# Patient Record
Sex: MALE | Race: WHITE | NOT HISPANIC OR LATINO | Employment: UNEMPLOYED | URBAN - METROPOLITAN AREA
[De-identification: names, ages, dates, MRNs, and addresses within clinical notes are randomized per-mention and may not be internally consistent; named-entity substitution may affect disease eponyms.]

---

## 2023-01-01 ENCOUNTER — HOME CARE VISIT (OUTPATIENT)
Dept: HOME HEALTH SERVICES | Facility: HOME HEALTH | Age: 88
End: 2023-01-01
Payer: MEDICARE

## 2023-01-01 ENCOUNTER — HOME CARE VISIT (OUTPATIENT)
Dept: HOME HEALTH SERVICES | Facility: HOME HEALTH | Age: 88
End: 2023-01-01

## 2023-01-01 ENCOUNTER — TELEPHONE (OUTPATIENT)
Dept: PRIMARY CARE | Facility: CLINIC | Age: 88
End: 2023-01-01
Payer: MEDICARE

## 2023-01-01 ENCOUNTER — HOSPITAL ENCOUNTER (INPATIENT)
Facility: HOSPITAL | Age: 88
LOS: 2 days | DRG: 208 | End: 2023-11-06
Attending: EMERGENCY MEDICINE | Admitting: INTERNAL MEDICINE
Payer: MEDICARE

## 2023-01-01 ENCOUNTER — HOSPITAL ENCOUNTER (OUTPATIENT)
Dept: DATA CONVERSION | Facility: HOSPITAL | Age: 88
Discharge: HOME | End: 2023-09-28
Payer: MEDICARE

## 2023-01-01 ENCOUNTER — TELEPHONE (OUTPATIENT)
Dept: PRIMARY CARE | Facility: CLINIC | Age: 88
End: 2023-01-01

## 2023-01-01 ENCOUNTER — APPOINTMENT (OUTPATIENT)
Dept: RADIOLOGY | Facility: HOSPITAL | Age: 88
DRG: 208 | End: 2023-01-01
Payer: MEDICARE

## 2023-01-01 ENCOUNTER — OFFICE VISIT (OUTPATIENT)
Dept: PRIMARY CARE | Facility: CLINIC | Age: 88
End: 2023-01-01
Payer: MEDICARE

## 2023-01-01 ENCOUNTER — APPOINTMENT (OUTPATIENT)
Dept: HOME HEALTH SERVICES | Facility: HOME HEALTH | Age: 88
End: 2023-01-01
Payer: MEDICARE

## 2023-01-01 ENCOUNTER — HOME HEALTH ADMISSION (OUTPATIENT)
Dept: HOME HEALTH SERVICES | Facility: HOME HEALTH | Age: 88
End: 2023-01-01
Payer: MEDICARE

## 2023-01-01 ENCOUNTER — NURSING HOME VISIT (OUTPATIENT)
Dept: POST ACUTE CARE | Facility: EXTERNAL LOCATION | Age: 88
End: 2023-01-01
Payer: MEDICARE

## 2023-01-01 VITALS
DIASTOLIC BLOOD PRESSURE: 70 MMHG | OXYGEN SATURATION: 95 % | TEMPERATURE: 97.8 F | HEART RATE: 88 BPM | SYSTOLIC BLOOD PRESSURE: 130 MMHG

## 2023-01-01 VITALS
RESPIRATION RATE: 16 BRPM | OXYGEN SATURATION: 95 % | DIASTOLIC BLOOD PRESSURE: 50 MMHG | TEMPERATURE: 97.3 F | WEIGHT: 107.2 LBS | HEART RATE: 85 BPM | SYSTOLIC BLOOD PRESSURE: 106 MMHG | HEIGHT: 69 IN | BODY MASS INDEX: 15.88 KG/M2

## 2023-01-01 VITALS
SYSTOLIC BLOOD PRESSURE: 100 MMHG | BODY MASS INDEX: 16.44 KG/M2 | HEART RATE: 60 BPM | RESPIRATION RATE: 20 BRPM | TEMPERATURE: 98.2 F | WEIGHT: 111 LBS | HEIGHT: 69 IN | DIASTOLIC BLOOD PRESSURE: 52 MMHG

## 2023-01-01 VITALS
DIASTOLIC BLOOD PRESSURE: 64 MMHG | SYSTOLIC BLOOD PRESSURE: 130 MMHG | TEMPERATURE: 98.5 F | HEART RATE: 68 BPM | RESPIRATION RATE: 16 BRPM

## 2023-01-01 VITALS
BODY MASS INDEX: 15.15 KG/M2 | HEIGHT: 70 IN | WEIGHT: 105.82 LBS | HEART RATE: 86 BPM | DIASTOLIC BLOOD PRESSURE: 33 MMHG | SYSTOLIC BLOOD PRESSURE: 65 MMHG | TEMPERATURE: 97.3 F | RESPIRATION RATE: 10 BRPM | OXYGEN SATURATION: 95 %

## 2023-01-01 VITALS — SYSTOLIC BLOOD PRESSURE: 100 MMHG | HEART RATE: 56 BPM | DIASTOLIC BLOOD PRESSURE: 76 MMHG | TEMPERATURE: 97.6 F

## 2023-01-01 VITALS
OXYGEN SATURATION: 90 % | RESPIRATION RATE: 18 BRPM | HEART RATE: 82 BPM | DIASTOLIC BLOOD PRESSURE: 58 MMHG | SYSTOLIC BLOOD PRESSURE: 118 MMHG

## 2023-01-01 DIAGNOSIS — E86.0 DEHYDRATION: Primary | ICD-10-CM

## 2023-01-01 DIAGNOSIS — Z00.00 ENCOUNTER FOR GENERAL ADULT MEDICAL EXAMINATION WITHOUT ABNORMAL FINDINGS: ICD-10-CM

## 2023-01-01 DIAGNOSIS — R63.4 WEIGHT LOSS: ICD-10-CM

## 2023-01-01 DIAGNOSIS — R62.7 FAILURE TO THRIVE IN ADULT: ICD-10-CM

## 2023-01-01 DIAGNOSIS — Z86.2 HISTORY OF MYELODYSPLASTIC SYNDROME: ICD-10-CM

## 2023-01-01 DIAGNOSIS — I48.11 LONGSTANDING PERSISTENT ATRIAL FIBRILLATION (MULTI): ICD-10-CM

## 2023-01-01 DIAGNOSIS — I48.91 ATRIAL FIBRILLATION, UNSPECIFIED TYPE (MULTI): ICD-10-CM

## 2023-01-01 DIAGNOSIS — J44.9 CHRONIC OBSTRUCTIVE PULMONARY DISEASE, UNSPECIFIED COPD TYPE (MULTI): ICD-10-CM
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
## 2023-01-01 DIAGNOSIS — J96.00 ACUTE RESPIRATORY FAILURE, UNSPECIFIED WHETHER WITH HYPOXIA OR HYPERCAPNIA (MULTI): Primary | ICD-10-CM

## 2023-01-01 DIAGNOSIS — N40.0 BENIGN PROSTATIC HYPERPLASIA WITHOUT LOWER URINARY TRACT SYMPTOMS: ICD-10-CM

## 2023-01-01 LAB
ALBUMIN SERPL-MCNC: 3.1 G/DL (ref 3.5–5)
ALP BLD-CCNC: 81 U/L (ref 35–125)
ALT SERPL-CCNC: 5 U/L (ref 5–40)
ANION GAP SERPL CALC-SCNC: 7 MMOL/L
ANION GAP SERPL CALCULATED.3IONS-SCNC: 6 MMOL/L (ref 0–19)
ANION GAP SERPL CALCULATED.3IONS-SCNC: 8 MMOL/L (ref 0–19)
APPEARANCE UR: CLEAR
AST SERPL-CCNC: 17 U/L (ref 5–40)
BACTERIA UR CULT: NO GROWTH
BASOPHILS # BLD AUTO: 0.02 X10*3/UL (ref 0–0.1)
BASOPHILS NFR BLD AUTO: 0.8 %
BILIRUB SERPL-MCNC: 0.5 MG/DL (ref 0.1–1.2)
BILIRUB UR STRIP.AUTO-MCNC: NEGATIVE MG/DL
BUN SERPL-MCNC: 25 MG/DL (ref 8–25)
BUN SERPL-MCNC: 28 MG/DL (ref 8–25)
BUN SERPL-MCNC: 31 MG/DL (ref 8–25)
BUN/CREAT SERPL: 27.8 RATIO (ref 8–21)
BUN/CREAT SERPL: 31.1 RATIO (ref 8–21)
CALCIUM SERPL-MCNC: 8.8 MG/DL (ref 8.5–10.4)
CALCIUM SERPL-MCNC: 9 MG/DL (ref 8.5–10.4)
CALCIUM SERPL-MCNC: 9.5 MG/DL (ref 8.5–10.4)
CHLORIDE SERPL-SCNC: 106 MMOL/L (ref 97–107)
CHLORIDE SERPL-SCNC: 98 MMOL/L (ref 97–107)
CHLORIDE SERPL-SCNC: 99 MMOL/L (ref 97–107)
CO2 SERPL-SCNC: 28 MMOL/L (ref 24–31)
CO2 SERPL-SCNC: 29 MMOL/L (ref 24–31)
CO2 SERPL-SCNC: 33 MMOL/L (ref 24–31)
COLOR UR: YELLOW
CREAT SERPL-MCNC: 0.9 MG/DL (ref 0.4–1.6)
DEPRECATED RDW RBC AUTO: 48 FL (ref 37–54)
DEPRECATED RDW RBC AUTO: 49.6 FL (ref 37–54)
EOSINOPHIL # BLD AUTO: 0.01 X10*3/UL (ref 0–0.4)
EOSINOPHIL NFR BLD AUTO: 0.4 %
ERYTHROCYTE [DISTWIDTH] IN BLOOD BY AUTOMATED COUNT: 14.1 % (ref 11.7–15)
ERYTHROCYTE [DISTWIDTH] IN BLOOD BY AUTOMATED COUNT: 14.2 % (ref 11.7–15)
ERYTHROCYTE [DISTWIDTH] IN BLOOD BY AUTOMATED COUNT: 16 % (ref 11.5–14.5)
GFR SERPL CREATININE-BSD FRML MDRD: 80 ML/MIN/1.73M*2
GFR SERPL CREATININE-BSD FRML MDRD: 81 ML/MIN/1.73 M2
GFR SERPL CREATININE-BSD FRML MDRD: 81 ML/MIN/1.73 M2
GLUCOSE SERPL-MCNC: 89 MG/DL (ref 65–99)
GLUCOSE SERPL-MCNC: 89 MG/DL (ref 65–99)
GLUCOSE SERPL-MCNC: 96 MG/DL (ref 65–99)
GLUCOSE UR STRIP.AUTO-MCNC: NORMAL MG/DL
HCT VFR BLD AUTO: 22.6 % (ref 41–50)
HCT VFR BLD AUTO: 24.4 % (ref 41–50)
HCT VFR BLD AUTO: 34.3 % (ref 41–52)
HGB BLD-MCNC: 7.2 GM/DL (ref 13.5–16.5)
HGB BLD-MCNC: 7.8 GM/DL (ref 13.5–16.5)
HGB BLD-MCNC: 9.8 G/DL (ref 13.5–17.5)
IMM GRANULOCYTES # BLD AUTO: 0 X10*3/UL (ref 0–0.5)
IMM GRANULOCYTES NFR BLD AUTO: 0 % (ref 0–0.9)
KETONES UR STRIP.AUTO-MCNC: NEGATIVE MG/DL
LACTATE BLDV-SCNC: 1 MMOL/L (ref 0.4–2)
LEUKOCYTE ESTERASE UR QL STRIP.AUTO: NEGATIVE
LYMPHOCYTES # BLD AUTO: 0.97 X10*3/UL (ref 0.8–3)
LYMPHOCYTES NFR BLD AUTO: 39.6 %
MCH RBC QN AUTO: 29.2 PG (ref 26–34)
MCH RBC QN AUTO: 30.3 PG (ref 26–34)
MCH RBC QN AUTO: 30.7 PG (ref 26–34)
MCHC RBC AUTO-ENTMCNC: 28.6 G/DL (ref 32–36)
MCHC RBC AUTO-ENTMCNC: 31.9 % (ref 31–37)
MCHC RBC AUTO-ENTMCNC: 32 % (ref 31–37)
MCV RBC AUTO: 102 FL (ref 80–100)
MCV RBC AUTO: 95 FL (ref 80–100)
MCV RBC AUTO: 96.1 FL (ref 80–100)
MONOCYTES # BLD AUTO: 0.42 X10*3/UL (ref 0.05–0.8)
MONOCYTES NFR BLD AUTO: 17.1 %
NEUTROPHILS # BLD AUTO: 1.03 X10*3/UL (ref 1.6–5.5)
NEUTROPHILS NFR BLD AUTO: 42.1 %
NITRITE UR QL STRIP.AUTO: NEGATIVE
NRBC BLD-RTO: 0 /100 WBC
NRBC BLD-RTO: 0 /100 WBC
NRBC BLD-RTO: 0 /100 WBCS (ref 0–0)
NT-PROBNP SERPL-MCNC: 1609 PG/ML (ref 0–852)
PH UR STRIP.AUTO: 5 [PH]
PLATELET # BLD AUTO: 119 K/UL (ref 150–450)
PLATELET # BLD AUTO: 130 K/UL (ref 150–450)
PLATELET # BLD AUTO: 87 X10*3/UL (ref 150–450)
PMV BLD AUTO: 11.5 CU (ref 7–12.6)
PMV BLD AUTO: 11.6 CU (ref 7–12.6)
POTASSIUM SERPL-SCNC: 4.4 MMOL/L (ref 3.4–5.1)
POTASSIUM SERPL-SCNC: 4.5 MMOL/L (ref 3.4–5.1)
POTASSIUM SERPL-SCNC: 4.7 MMOL/L (ref 3.4–5.1)
PROT SERPL-MCNC: 8.7 G/DL (ref 5.9–7.9)
PROT UR STRIP.AUTO-MCNC: ABNORMAL MG/DL
RBC # BLD AUTO: 2.38 M/UL (ref 4.5–5.5)
RBC # BLD AUTO: 2.54 M/UL (ref 4.5–5.5)
RBC # BLD AUTO: 3.36 X10*6/UL (ref 4.5–5.9)
RBC # UR STRIP.AUTO: ABNORMAL /UL
RBC #/AREA URNS AUTO: ABNORMAL /HPF
RBC MORPH BLD: NORMAL
SARS-COV-2 RNA RESP QL NAA+PROBE: NOT DETECTED
SODIUM SERPL-SCNC: 132 MMOL/L (ref 133–145)
SODIUM SERPL-SCNC: 136 MMOL/L (ref 133–145)
SODIUM SERPL-SCNC: 146 MMOL/L (ref 133–145)
SP GR UR STRIP.AUTO: 1.02
TROPONIN T SERPL-MCNC: 58 NG/L
TROPONIN T SERPL-MCNC: 67 NG/L
TROPONIN T SERPL-MCNC: 67 NG/L
UROBILINOGEN UR STRIP.AUTO-MCNC: NORMAL MG/DL
WBC # BLD AUTO: 1.7 K/UL (ref 4.5–11)
WBC # BLD AUTO: 2.2 K/UL (ref 4.5–11)
WBC # BLD AUTO: 2.5 X10*3/UL (ref 4.4–11.3)
WBC #/AREA URNS AUTO: ABNORMAL /HPF

## 2023-01-01 PROCEDURE — 85025 COMPLETE CBC W/AUTO DIFF WBC: CPT | Performed by: EMERGENCY MEDICINE

## 2023-01-01 PROCEDURE — G0157 HHC PT ASSISTANT EA 15: HCPCS

## 2023-01-01 PROCEDURE — 87635 SARS-COV-2 COVID-19 AMP PRB: CPT | Performed by: EMERGENCY MEDICINE

## 2023-01-01 PROCEDURE — 36415 COLL VENOUS BLD VENIPUNCTURE: CPT | Performed by: EMERGENCY MEDICINE

## 2023-01-01 PROCEDURE — 2500000004 HC RX 250 GENERAL PHARMACY W/ HCPCS (ALT 636 FOR OP/ED): Performed by: INTERNAL MEDICINE

## 2023-01-01 PROCEDURE — 99309 SBSQ NF CARE MODERATE MDM 30: CPT | Performed by: PHYSICIAN ASSISTANT

## 2023-01-01 PROCEDURE — 1210000001 HC SEMI-PRIVATE ROOM DAILY

## 2023-01-01 PROCEDURE — 84484 ASSAY OF TROPONIN QUANT: CPT | Performed by: EMERGENCY MEDICINE

## 2023-01-01 PROCEDURE — G0151 HHCP-SERV OF PT,EA 15 MIN: HCPCS

## 2023-01-01 PROCEDURE — 1160F RVW MEDS BY RX/DR IN RCRD: CPT | Performed by: NURSE PRACTITIONER

## 2023-01-01 PROCEDURE — 81001 URINALYSIS AUTO W/SCOPE: CPT | Performed by: EMERGENCY MEDICINE

## 2023-01-01 PROCEDURE — 94760 N-INVAS EAR/PLS OXIMETRY 1: CPT

## 2023-01-01 PROCEDURE — G0180 MD CERTIFICATION HHA PATIENT: HCPCS | Performed by: FAMILY MEDICINE

## 2023-01-01 PROCEDURE — 94002 VENT MGMT INPAT INIT DAY: CPT | Mod: CCI

## 2023-01-01 PROCEDURE — 83605 ASSAY OF LACTIC ACID: CPT | Performed by: EMERGENCY MEDICINE

## 2023-01-01 PROCEDURE — 99349 HOME/RES VST EST MOD MDM 40: CPT | Performed by: NURSE PRACTITIONER

## 2023-01-01 PROCEDURE — 83880 ASSAY OF NATRIURETIC PEPTIDE: CPT | Performed by: EMERGENCY MEDICINE

## 2023-01-01 PROCEDURE — 0BH17EZ INSERTION OF ENDOTRACHEAL AIRWAY INTO TRACHEA, VIA NATURAL OR ARTIFICIAL OPENING: ICD-10-PCS | Performed by: EMERGENCY MEDICINE

## 2023-01-01 PROCEDURE — 31500 INSERT EMERGENCY AIRWAY: CPT

## 2023-01-01 PROCEDURE — 71045 X-RAY EXAM CHEST 1 VIEW: CPT

## 2023-01-01 PROCEDURE — 92950 HEART/LUNG RESUSCITATION CPR: CPT

## 2023-01-01 PROCEDURE — 87086 URINE CULTURE/COLONY COUNT: CPT | Mod: WESLAB | Performed by: EMERGENCY MEDICINE

## 2023-01-01 PROCEDURE — 96365 THER/PROPH/DIAG IV INF INIT: CPT | Mod: 59

## 2023-01-01 PROCEDURE — 99285 EMERGENCY DEPT VISIT HI MDM: CPT | Mod: 25 | Performed by: EMERGENCY MEDICINE

## 2023-01-01 PROCEDURE — 2500000004 HC RX 250 GENERAL PHARMACY W/ HCPCS (ALT 636 FOR OP/ED): Performed by: EMERGENCY MEDICINE

## 2023-01-01 PROCEDURE — 87075 CULTR BACTERIA EXCEPT BLOOD: CPT | Mod: WESLAB | Performed by: EMERGENCY MEDICINE

## 2023-01-01 PROCEDURE — 87040 BLOOD CULTURE FOR BACTERIA: CPT | Mod: WESLAB | Performed by: EMERGENCY MEDICINE

## 2023-01-01 PROCEDURE — 0023 HH SOC

## 2023-01-01 PROCEDURE — 5A1935Z RESPIRATORY VENTILATION, LESS THAN 24 CONSECUTIVE HOURS: ICD-10-PCS | Performed by: EMERGENCY MEDICINE

## 2023-01-01 PROCEDURE — G0299 HHS/HOSPICE OF RN EA 15 MIN: HCPCS

## 2023-01-01 PROCEDURE — 1159F MED LIST DOCD IN RCRD: CPT | Performed by: NURSE PRACTITIONER

## 2023-01-01 PROCEDURE — 1125F AMNT PAIN NOTED PAIN PRSNT: CPT | Performed by: NURSE PRACTITIONER

## 2023-01-01 PROCEDURE — 80053 COMPREHEN METABOLIC PANEL: CPT | Performed by: EMERGENCY MEDICINE

## 2023-01-01 RX ORDER — TAMSULOSIN HYDROCHLORIDE 0.4 MG/1
1 CAPSULE ORAL DAILY
COMMUNITY
Start: 2022-02-11 | End: 2023-01-01 | Stop reason: WASHOUT

## 2023-01-01 RX ORDER — BISACODYL 5 MG
1 TABLET, DELAYED RELEASE (ENTERIC COATED) ORAL DAILY PRN
COMMUNITY
Start: 2023-01-01

## 2023-01-01 RX ORDER — DEXTROSE MONOHYDRATE AND SODIUM CHLORIDE 5; .45 G/100ML; G/100ML
100 INJECTION, SOLUTION INTRAVENOUS CONTINUOUS
Status: CANCELLED | OUTPATIENT
Start: 2023-01-01 | End: 2023-01-01

## 2023-01-01 RX ORDER — MIDAZOLAM HYDROCHLORIDE 1 MG/ML
.5-2 INJECTION, SOLUTION INTRAVENOUS ONCE
Status: DISCONTINUED | OUTPATIENT
Start: 2023-01-01 | End: 2023-01-01

## 2023-01-01 RX ORDER — HALOPERIDOL 5 MG/ML
1 INJECTION INTRAMUSCULAR EVERY 6 HOURS
Status: DISCONTINUED | OUTPATIENT
Start: 2023-01-01 | End: 2023-01-01

## 2023-01-01 RX ORDER — HALOPERIDOL 5 MG/ML
1 INJECTION INTRAMUSCULAR EVERY 6 HOURS PRN
Status: DISCONTINUED | OUTPATIENT
Start: 2023-01-01 | End: 2023-01-01 | Stop reason: HOSPADM

## 2023-01-01 RX ORDER — PANTOPRAZOLE SODIUM 40 MG/10ML
40 INJECTION, POWDER, LYOPHILIZED, FOR SOLUTION INTRAVENOUS DAILY
Status: CANCELLED | OUTPATIENT
Start: 2023-01-01

## 2023-01-01 RX ORDER — MIDAZOLAM HYDROCHLORIDE 5 MG/ML
5 INJECTION, SOLUTION INTRAMUSCULAR; INTRAVENOUS ONCE
Status: COMPLETED | OUTPATIENT
Start: 2023-01-01 | End: 2023-01-01

## 2023-01-01 RX ORDER — LORAZEPAM 2 MG/ML
0.5 INJECTION INTRAMUSCULAR EVERY 2 HOUR PRN
Status: DISCONTINUED | OUTPATIENT
Start: 2023-01-01 | End: 2023-01-01 | Stop reason: HOSPADM

## 2023-01-01 RX ORDER — MIDAZOLAM HYDROCHLORIDE 1 MG/ML
.5-2 INJECTION, SOLUTION INTRAVENOUS ONCE
Status: COMPLETED | OUTPATIENT
Start: 2023-01-01 | End: 2023-01-01

## 2023-01-01 RX ORDER — LORAZEPAM 2 MG/ML
0.5 INJECTION INTRAMUSCULAR EVERY 4 HOURS PRN
Status: DISCONTINUED | OUTPATIENT
Start: 2023-01-01 | End: 2023-01-01

## 2023-01-01 RX ORDER — TIOTROPIUM BROMIDE 18 UG/1
1 CAPSULE ORAL; RESPIRATORY (INHALATION)
COMMUNITY
Start: 2023-01-01 | End: 2023-01-01 | Stop reason: WASHOUT

## 2023-01-01 RX ORDER — INFANT FORMULA WITH IRON
1 POWDER (GRAM) ORAL
COMMUNITY
Start: 2023-01-01

## 2023-01-01 RX ORDER — DEXTROSE MONOHYDRATE AND SODIUM CHLORIDE 5; .9 G/100ML; G/100ML
100 INJECTION, SOLUTION INTRAVENOUS CONTINUOUS
Status: CANCELLED | OUTPATIENT
Start: 2023-01-01 | End: 2023-01-01

## 2023-01-01 RX ORDER — HYOSCYAMINE SULFATE 0.12 MG/1
0.12 TABLET, ORALLY DISINTEGRATING ORAL EVERY 4 HOURS PRN
Status: DISCONTINUED | OUTPATIENT
Start: 2023-01-01 | End: 2023-01-01 | Stop reason: HOSPADM

## 2023-01-01 RX ORDER — MORPHINE SULFATE 2 MG/ML
2 INJECTION, SOLUTION INTRAMUSCULAR; INTRAVENOUS
Status: DISCONTINUED | OUTPATIENT
Start: 2023-01-01 | End: 2023-01-01 | Stop reason: HOSPADM

## 2023-01-01 RX ORDER — LACTOSE-REDUCED FOOD
237 LIQUID (ML) ORAL 3 TIMES DAILY
COMMUNITY
Start: 2023-01-01

## 2023-01-01 RX ORDER — LIDOCAINE 560 MG/1
PATCH PERCUTANEOUS; TOPICAL; TRANSDERMAL
COMMUNITY

## 2023-01-01 RX ADMIN — CEFEPIME: 1 INJECTION, POWDER, FOR SOLUTION INTRAMUSCULAR; INTRAVENOUS at 10:49

## 2023-01-01 RX ADMIN — MORPHINE SULFATE 2 MG: 2 INJECTION, SOLUTION INTRAMUSCULAR; INTRAVENOUS at 19:35

## 2023-01-01 RX ADMIN — HALOPERIDOL LACTATE 1 MG: 5 INJECTION, SOLUTION INTRAMUSCULAR at 15:32

## 2023-01-01 RX ADMIN — MIDAZOLAM HYDROCHLORIDE 1 MG/HR: 1 INJECTION, SOLUTION INTRAVENOUS at 10:41

## 2023-01-01 RX ADMIN — MORPHINE SULFATE 2 MG: 2 INJECTION, SOLUTION INTRAMUSCULAR; INTRAVENOUS at 15:32

## 2023-01-01 RX ADMIN — SODIUM CHLORIDE 1000 ML: 900 INJECTION, SOLUTION INTRAVENOUS at 10:52

## 2023-01-01 RX ADMIN — MIDAZOLAM HYDROCHLORIDE 5 MG: 5 INJECTION, SOLUTION INTRAMUSCULAR; INTRAVENOUS at 10:00

## 2023-01-01 SDOH — HEALTH STABILITY: PHYSICAL HEALTH: EXERCISE TYPE: BLE EXS

## 2023-01-01 SDOH — SOCIAL STABILITY: SOCIAL INSECURITY
WITHIN THE LAST YEAR, HAVE TO BEEN RAPED OR FORCED TO HAVE ANY KIND OF SEXUAL ACTIVITY BY YOUR PARTNER OR EX-PARTNER?: NO

## 2023-01-01 SDOH — HEALTH STABILITY: PHYSICAL HEALTH: EXERCISE ACTIVITY: APS, HIP ABD, HEEL SLIDES

## 2023-01-01 SDOH — SOCIAL STABILITY: SOCIAL INSECURITY: ARE THERE ANY APPARENT SIGNS OF INJURIES/BEHAVIORS THAT COULD BE RELATED TO ABUSE/NEGLECT?: UNABLE TO ASSESS

## 2023-01-01 SDOH — HEALTH STABILITY: PHYSICAL HEALTH: EXERCISE COMMENTS: PT DID NOT FEEL GOOD AT ALL BUT DID RETURN CORRECT DEMOS OF THER EXS

## 2023-01-01 SDOH — HEALTH STABILITY: PHYSICAL HEALTH: EXERCISE ACTIVITIES SETS: 1

## 2023-01-01 SDOH — HEALTH STABILITY: MENTAL HEALTH: HOW OFTEN DO YOU HAVE 6 OR MORE DRINKS ON ONE OCCASION?: NEVER

## 2023-01-01 SDOH — HEALTH STABILITY: PHYSICAL HEALTH: PHYSICAL EXERCISE: 10

## 2023-01-01 SDOH — SOCIAL STABILITY: SOCIAL INSECURITY
WITHIN THE LAST YEAR, HAVE YOU BEEN KICKED, HIT, SLAPPED, OR OTHERWISE PHYSICALLY HURT BY YOUR PARTNER OR EX-PARTNER?: NO

## 2023-01-01 SDOH — HEALTH STABILITY: PHYSICAL HEALTH: PHYSICAL EXERCISE: SEATED

## 2023-01-01 SDOH — ECONOMIC STABILITY: FOOD INSECURITY: WITHIN THE PAST 12 MONTHS, THE FOOD YOU BOUGHT JUST DIDN'T LAST AND YOU DIDN'T HAVE MONEY TO GET MORE.: NEVER TRUE

## 2023-01-01 SDOH — SOCIAL STABILITY: SOCIAL INSECURITY: DO YOU FEEL ANYONE HAS EXPLOITED OR TAKEN ADVANTAGE OF YOU FINANCIALLY OR OF YOUR PERSONAL PROPERTY?: UNABLE TO ASSESS

## 2023-01-01 SDOH — SOCIAL STABILITY: SOCIAL NETWORK: HOW OFTEN DO YOU ATTENT MEETINGS OF THE CLUB OR ORGANIZATION YOU BELONG TO?: NEVER

## 2023-01-01 SDOH — HEALTH STABILITY: PHYSICAL HEALTH: EXERCISE ACTIVITY: LAQS, MARCHES, HIP ABD WITH YELLOW TBAND

## 2023-01-01 SDOH — SOCIAL STABILITY: SOCIAL INSECURITY: DO YOU FEEL UNSAFE GOING BACK TO THE PLACE WHERE YOU ARE LIVING?: UNABLE TO ASSESS

## 2023-01-01 SDOH — ECONOMIC STABILITY: INCOME INSECURITY: IN THE PAST 12 MONTHS, HAS THE ELECTRIC, GAS, OIL, OR WATER COMPANY THREATENED TO SHUT OFF SERVICE IN YOUR HOME?: NO

## 2023-01-01 SDOH — HEALTH STABILITY: PHYSICAL HEALTH: EXERCISE ACTIVITY: APS, LAQS, MARCHES, HIP ABD/ADD

## 2023-01-01 SDOH — SOCIAL STABILITY: SOCIAL INSECURITY: ABUSE: ADULT

## 2023-01-01 SDOH — HEALTH STABILITY: PHYSICAL HEALTH: ON AVERAGE, HOW MANY MINUTES DO YOU ENGAGE IN EXERCISE AT THIS LEVEL?: 0 MIN

## 2023-01-01 SDOH — HEALTH STABILITY: MENTAL HEALTH: HOW OFTEN DO YOU HAVE A DRINK CONTAINING ALCOHOL?: NEVER

## 2023-01-01 SDOH — SOCIAL STABILITY: SOCIAL INSECURITY: WITHIN THE LAST YEAR, HAVE YOU BEEN AFRAID OF YOUR PARTNER OR EX-PARTNER?: NO

## 2023-01-01 SDOH — HEALTH STABILITY: PHYSICAL HEALTH: PHYSICAL EXERCISE: 5

## 2023-01-01 SDOH — SOCIAL STABILITY: SOCIAL NETWORK: IN A TYPICAL WEEK, HOW MANY TIMES DO YOU TALK ON THE PHONE WITH FAMILY, FRIENDS, OR NEIGHBORS?: NEVER

## 2023-01-01 SDOH — HEALTH STABILITY: PHYSICAL HEALTH: EXERCISE COMMENTS: DUCTION        FOR UP TO 20 REPETITIONS, TWICE A DAY.

## 2023-01-01 SDOH — HEALTH STABILITY: MENTAL HEALTH: HOW MANY STANDARD DRINKS CONTAINING ALCOHOL DO YOU HAVE ON A TYPICAL DAY?: PATIENT DOES NOT DRINK

## 2023-01-01 SDOH — HEALTH STABILITY: PHYSICAL HEALTH: EXERCISE ACTIVITY: HIP ABD, HEEL SLIDES

## 2023-01-01 SDOH — ECONOMIC STABILITY: FOOD INSECURITY: WITHIN THE PAST 12 MONTHS, YOU WORRIED THAT YOUR FOOD WOULD RUN OUT BEFORE YOU GOT MONEY TO BUY MORE.: NEVER TRUE

## 2023-01-01 SDOH — SOCIAL STABILITY: SOCIAL NETWORK
DO YOU BELONG TO ANY CLUBS OR ORGANIZATIONS SUCH AS CHURCH GROUPS UNIONS, FRATERNAL OR ATHLETIC GROUPS, OR SCHOOL GROUPS?: NO

## 2023-01-01 SDOH — HEALTH STABILITY: PHYSICAL HEALTH
EXERCISE COMMENTS: STRENGTHENING EXERCISES SEATED AND SUPINE X 10 AP HEEL SLIDES SLR ABD , LAQ HIP KNEE FLEXIION HEELSLIDES

## 2023-01-01 SDOH — SOCIAL STABILITY: SOCIAL INSECURITY: HAVE YOU HAD THOUGHTS OF HARMING ANYONE ELSE?: UNABLE TO ASSESS

## 2023-01-01 SDOH — SOCIAL STABILITY: SOCIAL INSECURITY: ARE YOU OR HAVE YOU BEEN THREATENED OR ABUSED PHYSICALLY, EMOTIONALLY, OR SEXUALLY BY ANYONE?: UNABLE TO ASSESS

## 2023-01-01 SDOH — HEALTH STABILITY: PHYSICAL HEALTH: PHYSICAL EXERCISE: RECLINED

## 2023-01-01 SDOH — ECONOMIC STABILITY: INCOME INSECURITY: HOW HARD IS IT FOR YOU TO PAY FOR THE VERY BASICS LIKE FOOD, HOUSING, MEDICAL CARE, AND HEATING?: NOT HARD AT ALL

## 2023-01-01 SDOH — HEALTH STABILITY: PHYSICAL HEALTH: ON AVERAGE, HOW MANY DAYS PER WEEK DO YOU ENGAGE IN MODERATE TO STRENUOUS EXERCISE (LIKE A BRISK WALK)?: 0 DAYS

## 2023-01-01 SDOH — SOCIAL STABILITY: SOCIAL NETWORK: ARE YOU MARRIED, WIDOWED, DIVORCED, SEPARATED, NEVER MARRIED, OR LIVING WITH A PARTNER?: WIDOWED

## 2023-01-01 SDOH — SOCIAL STABILITY: SOCIAL INSECURITY: HAS ANYONE EVER THREATENED TO HURT YOUR FAMILY OR YOUR PETS?: UNABLE TO ASSESS

## 2023-01-01 SDOH — SOCIAL STABILITY: SOCIAL INSECURITY: WITHIN THE LAST YEAR, HAVE YOU BEEN HUMILIATED OR EMOTIONALLY ABUSED IN OTHER WAYS BY YOUR PARTNER OR EX-PARTNER?: NO

## 2023-01-01 SDOH — HEALTH STABILITY: PHYSICAL HEALTH
EXERCISE COMMENTS: PT NEEDING OCCASSIONAL REST BREAKS DUEING EXS BUT IMPROVED TOLERANCE NOTED, DAUGHTER STATES PT HAS BEEN DOING HEP

## 2023-01-01 SDOH — SOCIAL STABILITY: SOCIAL INSECURITY: WERE YOU ABLE TO COMPLETE ALL THE BEHAVIORAL HEALTH SCREENINGS?: NO

## 2023-01-01 SDOH — ECONOMIC STABILITY: HOUSING INSECURITY
IN THE LAST 12 MONTHS, WAS THERE A TIME WHEN YOU DID NOT HAVE A STEADY PLACE TO SLEEP OR SLEPT IN A SHELTER (INCLUDING NOW)?: NO

## 2023-01-01 SDOH — ECONOMIC STABILITY: TRANSPORTATION INSECURITY
IN THE PAST 12 MONTHS, HAS THE LACK OF TRANSPORTATION KEPT YOU FROM MEDICAL APPOINTMENTS OR FROM GETTING MEDICATIONS?: NO

## 2023-01-01 SDOH — SOCIAL STABILITY: SOCIAL INSECURITY: HAVE YOU HAD THOUGHTS OF HARMING ANYONE ELSE?: NO

## 2023-01-01 SDOH — SOCIAL STABILITY: SOCIAL NETWORK: HOW OFTEN DO YOU GET TOGETHER WITH FRIENDS OR RELATIVES?: NEVER

## 2023-01-01 SDOH — SOCIAL STABILITY: SOCIAL INSECURITY: DOES ANYONE TRY TO KEEP YOU FROM HAVING/CONTACTING OTHER FRIENDS OR DOING THINGS OUTSIDE YOUR HOME?: UNABLE TO ASSESS

## 2023-01-01 SDOH — SOCIAL STABILITY: SOCIAL NETWORK: HOW OFTEN DO YOU ATTEND CHURCH OR RELIGIOUS SERVICES?: NEVER

## 2023-01-01 SDOH — ECONOMIC STABILITY: INCOME INSECURITY: IN THE LAST 12 MONTHS, WAS THERE A TIME WHEN YOU WERE NOT ABLE TO PAY THE MORTGAGE OR RENT ON TIME?: NO

## 2023-01-01 SDOH — HEALTH STABILITY: PHYSICAL HEALTH
EXERCISE COMMENTS: REVIEW OF WRITTEN EXERCISES  1.  SEATED ANKLE PUMPS  2.  SEATED MARCHING OR HIP FLEXION  3.  SEATED FULL ARC QUADS OR "KICKS"  4.  SEATED HIP ABDUCTION  5.  SUPINE ANKLE PUMPS  6.  SUPINE HEEL SLIDES  7.  SUPINE STRAIGHT LEG RAISES  8.  SUPINE HIP AB

## 2023-01-01 SDOH — HEALTH STABILITY: PHYSICAL HEALTH: PHYSICAL EXERCISE: SUPINE

## 2023-01-01 SDOH — HEALTH STABILITY: PHYSICAL HEALTH: EXERCISE ACTIVITY: APS, LAQS, MARCHES

## 2023-01-01 SDOH — HEALTH STABILITY: PHYSICAL HEALTH: EXERCISE ACTIVITY: HEEL SLIDES HIP ABD

## 2023-01-01 SDOH — ECONOMIC STABILITY: TRANSPORTATION INSECURITY
IN THE PAST 12 MONTHS, HAS LACK OF TRANSPORTATION KEPT YOU FROM MEETINGS, WORK, OR FROM GETTING THINGS NEEDED FOR DAILY LIVING?: NO

## 2023-01-01 SDOH — HEALTH STABILITY: PHYSICAL HEALTH: EXERCISE ACTIVITY: SLRS

## 2023-01-01 SDOH — HEALTH STABILITY: PHYSICAL HEALTH: EXERCISE TYPE: BASIC HOME PROGRAM

## 2023-01-01 SDOH — ECONOMIC STABILITY: HOUSING INSECURITY: IN THE LAST 12 MONTHS, HOW MANY PLACES HAVE YOU LIVED?: 1

## 2023-01-01 ASSESSMENT — ENCOUNTER SYMPTOMS
DIARRHEA: 0
OCCASIONAL FEELINGS OF UNSTEADINESS: 1
PAIN: 1
ABDOMINAL PAIN: 0
SUBJECTIVE PAIN PROGRESSION: GRADUALLY IMPROVING
PAIN LOCATION: RIGHT KNEE
LAST BOWEL MOVEMENT: 66761
NAUSEA: 0
LOWEST PAIN SEVERITY IN PAST 24 HOURS: 5/10
PAIN LOCATION - PAIN QUALITY: STOMACH ACHE
PAIN LOCATION: ABDOMEN
OCCASIONAL FEELINGS OF UNSTEADINESS: 1
COUGH: 0
HEADACHES: 0
HIGHEST PAIN SEVERITY IN PAST 24 HOURS: 4/10
BOWEL PATTERN NORMAL: 1
OCCASIONAL FEELINGS OF UNSTEADINESS: 1
HIGHEST PAIN SEVERITY IN PAST 24 HOURS: 4/10
PAIN LOCATION: RIGHT KNEE
PAIN SEVERITY GOAL: 0/10
DENIES PAIN: 1
FREQUENCY: 0
PAIN LOCATION - PAIN SEVERITY: 3/10
PAIN LOCATION - PAIN SEVERITY: 0/10
LOSS OF SENSATION IN FEET: 0
PAIN LOCATION - PAIN QUALITY: ACHING
PAIN SEVERITY GOAL: 0/10
COUGH: 0
LOWEST PAIN SEVERITY IN PAST 24 HOURS: 0/10
SHORTNESS OF BREATH: 1
MUSCLE WEAKNESS: 1
BLURRED VISION: 1
DEPRESSION: 0
ARTHRALGIAS: 1
DYSURIA: 0
PAIN LOCATION - PAIN SEVERITY: 3/10
CHANGE IN APPETITE: DECREASED
PAIN LOCATION - RELIEVING FACTORS: REST
LOWEST PAIN SEVERITY IN PAST 24 HOURS: 2/10
PERSON REPORTING PAIN: PATIENT
DIZZINESS: 1
TROUBLE SWALLOWING: 0
CONSTIPATION: 0
DIARRHEA: 0
DEPRESSION: 0
PAIN: 1
FEVER: 0
HEMATURIA: 0
PERSON REPORTING PAIN: PATIENT
PAIN LOCATION - PAIN DURATION: CONSTANT
DYSPNEA ACTIVITY LEVEL: AFTER AMBULATING LESS THAN 10 FT
SUBJECTIVE PAIN PROGRESSION: UNCHANGED
DEPRESSION: 0
PAIN LOCATION - PAIN QUALITY: ACHING
VOMITING: 0
DIZZINESS: 1
NAUSEA: 1
PAIN LOCATION - PAIN FREQUENCY: CONSTANT
APPETITE CHANGE: 1
NAUSEA: 0
PALPITATIONS: 0
LOSS OF SENSATION IN FEET: 0
PAIN LOCATION - EXACERBATING FACTORS: INACTIVITY
WEAKNESS: 0
CHILLS: 0
PAIN LOCATION - RELIEVING FACTORS: TUMS
CONSTIPATION: 0
HIGHEST PAIN SEVERITY IN PAST 24 HOURS: 4/10
LOSS OF SENSATION IN FEET: 0
WHEEZING: 0
WHEEZING: 0
VOMITING: 0
HIGHEST PAIN SEVERITY IN PAST 24 HOURS: 5/10
PAIN LOCATION - PAIN SEVERITY: 4/10
PAIN LOCATION - PAIN FREQUENCY: INTERMITTENT
LIGHT-HEADEDNESS: 0
HIGHEST PAIN SEVERITY IN PAST 24 HOURS: 5/10
PERSON REPORTING PAIN: PATIENT
MUSCLE WEAKNESS: 1
PAIN LOCATION - PAIN DURATION: HOURS
APPETITE LEVEL: FAIR
SHORTNESS OF BREATH: 0
PAIN LOCATION - RELIEVING FACTORS: ACTIVITY
TREMORS: 0
SHORTNESS OF BREATH: 1
LOWEST PAIN SEVERITY IN PAST 24 HOURS: 0/10
DIZZINESS: 0
CONFUSION: 0
ABDOMINAL PAIN: 0
PAIN LOCATION: LEFT KNEE
PAIN SEVERITY GOAL: 0/10
SUBJECTIVE PAIN PROGRESSION: GRADUALLY IMPROVING
SUBJECTIVE PAIN PROGRESSION: GRADUALLY IMPROVING
DRY SKIN: 1
FEVER: 0
APPETITE CHANGE: 0
DESCRIPTION OF MEMORY LOSS: SHORT TERM
DENIES PAIN: 1
PAIN LOCATION - EXACERBATING FACTORS: UPRIGHT ACTIVITY
CHILLS: 0
NERVOUS/ANXIOUS: 0
PAIN LOCATION: RIGHT KNEE
PAIN LOCATION - PAIN SEVERITY: 4/10

## 2023-01-01 ASSESSMENT — COLUMBIA-SUICIDE SEVERITY RATING SCALE - C-SSRS
6. HAVE YOU EVER DONE ANYTHING, STARTED TO DO ANYTHING, OR PREPARED TO DO ANYTHING TO END YOUR LIFE?: NO
2. HAVE YOU ACTUALLY HAD ANY THOUGHTS OF KILLING YOURSELF?: NO
1. IN THE PAST MONTH, HAVE YOU WISHED YOU WERE DEAD OR WISHED YOU COULD GO TO SLEEP AND NOT WAKE UP?: NO

## 2023-01-01 ASSESSMENT — COGNITIVE AND FUNCTIONAL STATUS - GENERAL
PATIENT BASELINE BEDBOUND: NO
TURNING FROM BACK TO SIDE WHILE IN FLAT BAD: TOTAL
DRESSING REGULAR LOWER BODY CLOTHING: TOTAL
MOVING TO AND FROM BED TO CHAIR: TOTAL
DRESSING REGULAR LOWER BODY CLOTHING: TOTAL
DRESSING REGULAR UPPER BODY CLOTHING: TOTAL
DRESSING REGULAR UPPER BODY CLOTHING: TOTAL
EATING MEALS: TOTAL
CLIMB 3 TO 5 STEPS WITH RAILING: TOTAL
DRESSING REGULAR UPPER BODY CLOTHING: TOTAL
DAILY ACTIVITIY SCORE: 6
WALKING IN HOSPITAL ROOM: TOTAL
MOVING TO AND FROM BED TO CHAIR: TOTAL
CLIMB 3 TO 5 STEPS WITH RAILING: TOTAL
DAILY ACTIVITIY SCORE: 6
TOILETING: TOTAL
HELP NEEDED FOR BATHING: TOTAL
TURNING FROM BACK TO SIDE WHILE IN FLAT BAD: TOTAL
STANDING UP FROM CHAIR USING ARMS: TOTAL
HELP NEEDED FOR BATHING: TOTAL
TOILETING: TOTAL
STANDING UP FROM CHAIR USING ARMS: TOTAL
MOVING FROM LYING ON BACK TO SITTING ON SIDE OF FLAT BED WITH BEDRAILS: TOTAL
PERSONAL GROOMING: TOTAL
DRESSING REGULAR UPPER BODY CLOTHING: TOTAL
EATING MEALS: TOTAL
HELP NEEDED FOR BATHING: TOTAL
DAILY ACTIVITIY SCORE: 6
TOILETING: TOTAL
CLIMB 3 TO 5 STEPS WITH RAILING: TOTAL
DRESSING REGULAR LOWER BODY CLOTHING: TOTAL
PERSONAL GROOMING: TOTAL
TURNING FROM BACK TO SIDE WHILE IN FLAT BAD: TOTAL
MOBILITY SCORE: 6
EATING MEALS: TOTAL
DRESSING REGULAR LOWER BODY CLOTHING: TOTAL
MOBILITY SCORE: 6
MOVING TO AND FROM BED TO CHAIR: TOTAL
PERSONAL GROOMING: TOTAL
STANDING UP FROM CHAIR USING ARMS: TOTAL
PERSONAL GROOMING: TOTAL
CLIMB 3 TO 5 STEPS WITH RAILING: TOTAL
WALKING IN HOSPITAL ROOM: TOTAL
MOVING TO AND FROM BED TO CHAIR: TOTAL
MOBILITY SCORE: 6
MOVING FROM LYING ON BACK TO SITTING ON SIDE OF FLAT BED WITH BEDRAILS: TOTAL
WALKING IN HOSPITAL ROOM: TOTAL
TOILETING: TOTAL
WALKING IN HOSPITAL ROOM: TOTAL
MOBILITY SCORE: 6
TURNING FROM BACK TO SIDE WHILE IN FLAT BAD: TOTAL
MOVING FROM LYING ON BACK TO SITTING ON SIDE OF FLAT BED WITH BEDRAILS: TOTAL
MOVING FROM LYING ON BACK TO SITTING ON SIDE OF FLAT BED WITH BEDRAILS: TOTAL
HELP NEEDED FOR BATHING: TOTAL
DAILY ACTIVITIY SCORE: 6
EATING MEALS: TOTAL
STANDING UP FROM CHAIR USING ARMS: TOTAL

## 2023-01-01 ASSESSMENT — ACTIVITIES OF DAILY LIVING (ADL)
HEARING - RIGHT EAR: FUNCTIONAL
LACK_OF_TRANSPORTATION: NO
AMBULATION_DISTANCE/DURATION_TOLERATED: 50'
CURRENT_FUNCTION: CONTACT GUARD ASSIST
TOILETING: ONE PERSON
GROOMING_CURRENT_FUNCTION: MODERATE ASSIST
HEARING - LEFT EAR: FUNCTIONAL
CURRENT_FUNCTION: STAND BY ASSIST
TOILETING: DEPENDENT
BATHING_CURRENT_FUNCTION: MAXIMUM ASSIST
CURRENT_FUNCTION: ONE PERSON
GROOMING ASSESSED: 1
OASIS_M1830: 03
BATHING_CURRENT_FUNCTION: ONE PERSON
BATHING ASSESSED: 1
CURRENT_FUNCTION: ONE PERSON
AMBULATION ASSISTANCE: 1
JUDGMENT_ADEQUATE_SAFELY_COMPLETE_DAILY_ACTIVITIES: UNABLE TO ASSESS
TOILETING: MAXIMUM ASSIST
AMBULATION ASSISTANCE: STAND BY ASSIST
LACK_OF_TRANSPORTATION: NO
ENTERING_EXITING_HOME: MAXIMUM ASSIST
HOME_HEALTH_OASIS: 00
DRESSING YOURSELF: DEPENDENT
PHYSICAL_TRANSFERS_DEVICES: FWW
AMBULATION ASSISTANCE: ONE PERSON
FEEDING ASSESSED: 1
TOILETING: 1
PHYSICAL TRANSFERS ASSESSED: 1
AMBULATION ASSISTANCE: ONE PERSON
FEEDING YOURSELF: DEPENDENT
CURRENT_FUNCTION: SUPERVISION
CURRENT_FUNCTION: MODERATE ASSIST
PHYSICAL TRANSFERS ASSESSED: 1
FEEDING: MINIMUM ASSIST
PATIENT'S MEMORY ADEQUATE TO SAFELY COMPLETE DAILY ACTIVITIES?: UNABLE TO ASSESS
GROOMING: DEPENDENT
DRESSING_LB_CURRENT_FUNCTION: MAXIMUM ASSIST
ASSISTIVE_DEVICE: OTHER (COMMENT)
AMBULATION ASSISTANCE: 1
OASIS_M1830: 03
AMBULATION ASSISTANCE: CONTACT GUARD ASSIST
WALKS IN HOME: DEPENDENT
AMBULATION ASSISTANCE ON FLAT SURFACES: 1
AMBULATION ASSISTANCE: ONE PERSON
ADEQUATE_TO_COMPLETE_ADL: UNABLE TO ASSESS
AMBULATION ASSISTANCE ON FLAT SURFACES: 1
BATHING: DEPENDENT
AMBULATION ASSISTANCE: STAND BY ASSIST
PHYSICAL TRANSFERS ASSESSED: 1

## 2023-01-01 ASSESSMENT — PAIN SCALES - PAIN ASSESSMENT IN ADVANCED DEMENTIA (PAINAD)
TOTALSCORE: 2
BREATHING: 0
CONSOLABILITY: NO NEED TO CONSOLE
FACIALEXPRESSION: SMILING OR INEXPRESSIVE
BODYLANGUAGE: 0 - RELAXED.
BREATHING: NOISY LABORED BREATHING, LONG PERIODS OF HYPERVENTILATION, CHEYNE-STOKES RESPIRATIONS
FACIALEXPRESSION: 0
FACIALEXPRESSION: 0 - SMILING OR INEXPRESSIVE.
NEGVOCALIZATION: 0
NEGVOCALIZATION: 0 - NONE.
BODYLANGUAGE: 0
BODYLANGUAGE: RELAXED
TOTALSCORE: MEDICATION (SEE MAR)

## 2023-01-01 ASSESSMENT — LIFESTYLE VARIABLES
HOW OFTEN DO YOU HAVE A DRINK CONTAINING ALCOHOL: NEVER
SKIP TO QUESTIONS 9-10: 1
HOW OFTEN DO YOU HAVE 6 OR MORE DRINKS ON ONE OCCASION: NEVER
EVER HAD A DRINK FIRST THING IN THE MORNING TO STEADY YOUR NERVES TO GET RID OF A HANGOVER: NO
PRESCIPTION_ABUSE_PAST_12_MONTHS: NO
HAVE PEOPLE ANNOYED YOU BY CRITICIZING YOUR DRINKING: NO
SUBSTANCE_ABUSE_PAST_12_MONTHS: NO
AUDIT-C TOTAL SCORE: 0
AUDIT-C TOTAL SCORE: 0
EVER FELT BAD OR GUILTY ABOUT YOUR DRINKING: NO
HOW MANY STANDARD DRINKS CONTAINING ALCOHOL DO YOU HAVE ON A TYPICAL DAY: PATIENT DOES NOT DRINK
SUBSTANCE_ABUSE_PAST_12_MONTHS: NO
SKIP TO QUESTIONS 9-10: 1
HAVE YOU EVER FELT YOU SHOULD CUT DOWN ON YOUR DRINKING: NO
AUDIT-C TOTAL SCORE: 0
PRESCIPTION_ABUSE_PAST_12_MONTHS: NO
HOW OFTEN DO YOU HAVE A DRINK CONTAINING ALCOHOL: NEVER
REASON UNABLE TO ASSESS: NO
HOW OFTEN DO YOU HAVE 6 OR MORE DRINKS ON ONE OCCASION: NEVER

## 2023-01-01 ASSESSMENT — PATIENT HEALTH QUESTIONNAIRE - PHQ9
2. FEELING DOWN, DEPRESSED OR HOPELESS: NOT AT ALL
1. LITTLE INTEREST OR PLEASURE IN DOING THINGS: NOT AT ALL
SUM OF ALL RESPONSES TO PHQ9 QUESTIONS 1 & 2: 0
SUM OF ALL RESPONSES TO PHQ9 QUESTIONS 1 & 2: 0
2. FEELING DOWN, DEPRESSED OR HOPELESS: NOT AT ALL
1. LITTLE INTEREST OR PLEASURE IN DOING THINGS: NOT AT ALL

## 2023-01-01 ASSESSMENT — PAIN - FUNCTIONAL ASSESSMENT
PAIN_FUNCTIONAL_ASSESSMENT: PAINAD (PAIN ASSESSMENT IN ADVANCED DEMENTIA SCALE)
PAIN_FUNCTIONAL_ASSESSMENT: FLACC (FACE, LEGS, ACTIVITY, CRY, CONSOLABILITY)
PAIN_FUNCTIONAL_ASSESSMENT: 0-10
PAIN_FUNCTIONAL_ASSESSMENT: UNABLE TO SELF-REPORT

## 2023-01-01 ASSESSMENT — PAIN SCALES - GENERAL
PAINLEVEL_OUTOF10: 0 - NO PAIN
PAINLEVEL_OUTOF10: 0 - NO PAIN
PAINLEVEL: 4
PAINLEVEL_OUTOF10: 0 - NO PAIN

## 2023-10-05 PROBLEM — J44.9 CHRONIC OBSTRUCTIVE PULMONARY DISEASE (MULTI): Status: ACTIVE | Noted: 2023-01-01

## 2023-10-05 PROBLEM — R62.7 FAILURE TO THRIVE IN ADULT: Status: ACTIVE | Noted: 2023-01-01

## 2023-10-05 PROBLEM — I48.91 ATRIAL FIBRILLATION (MULTI): Status: ACTIVE | Noted: 2023-01-01

## 2023-10-05 PROBLEM — Z86.2 HISTORY OF MYELODYSPLASTIC SYNDROME: Status: ACTIVE | Noted: 2023-01-01

## 2023-10-05 PROBLEM — N40.0 BENIGN PROSTATIC HYPERPLASIA WITHOUT LOWER URINARY TRACT SYMPTOMS: Status: ACTIVE | Noted: 2023-01-01

## 2023-10-05 PROBLEM — E86.0 DEHYDRATION: Status: ACTIVE | Noted: 2023-01-01

## 2023-10-05 NOTE — LETTER
Patient: Mohsen Ardon  : 10/21/1931    Encounter Date: 10/05/2023    No chief complaint on file.      Subjective  [id number] : Mohsen Ardon is a 89 y.o. male admitted to [facility] for rehab.       HPI  Pt treated for dehydration and failure to thrive.   Pt seen while resting in bed.  He is alert and pleasant and offers no new complaints.  He is eating about 50% of him meals.   Dietician following and supplements have been ordered.   He denies any pain.   He had routine labs today which were reviewed.  Hemoglobin is stable at 8.3.   No new issues or concerns per nursing.  Pt lives with daughter.   Code status is DNR - CCA.  Review of Systems   Constitutional:  Negative for appetite change, chills and fever.   Respiratory:  Negative for cough, shortness of breath and wheezing.    Cardiovascular:  Negative for chest pain and leg swelling.   Gastrointestinal:  Negative for abdominal pain, constipation, diarrhea, nausea and vomiting.   Genitourinary:  Negative for dysuria, frequency and hematuria.   Neurological:  Negative for dizziness, tremors, weakness and headaches.   Psychiatric/Behavioral:  Negative for confusion. The patient is not nervous/anxious.    All other systems reviewed and are negative.      Objective  There were no vitals taken for this visit.   Physical Exam  Constitutional:       General: He is not in acute distress.     Comments: Thin body habitus.    Eyes:      Conjunctiva/sclera: Conjunctivae normal.      Pupils: Pupils are equal, round, and reactive to light.   Cardiovascular:      Rate and Rhythm: Normal rate and regular rhythm.      Heart sounds: No murmur heard.  Pulmonary:      Effort: Pulmonary effort is normal.      Breath sounds: No wheezing, rhonchi or rales.   Abdominal:      General: Abdomen is flat. Bowel sounds are normal. There is no distension.      Palpations: Abdomen is soft. There is no mass.      Tenderness: There is no abdominal tenderness.   Musculoskeletal:          "General: No swelling. Normal range of motion.   Skin:     General: Skin is warm and dry.      Findings: No rash.   Neurological:      General: No focal deficit present.      Mental Status: He is alert and oriented to person, place, and time. Mental status is at baseline.       No lab exists for component: \"CBC BMP\"  Assessment/Plan  Problem List Items Addressed This Visit             ICD-10-CM    Dehydration - Primary E86.0     Monitor weekly labs.          Failure to thrive in adult R62.7     Dietician following.  Monitor for weight loss.  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  Hemoglobin improved on todays labs 8.4.          Chronic obstructive pulmonary disease (CMS/HCC) J44.9     Albuterol BID routine.          Atrial fibrillation (CMS/HCC) I48.91     Rate controlled with metoprolol         Benign prostatic hyperplasia without lower urinary tract symptoms N40.0           Time spent: 30 min in review of chart, labs and orders, consultation with pt and documentation.       Electronically Signed By: Tiffanie Ivey PA-C   10/5/23  1:59 PM  "

## 2023-10-05 NOTE — PROGRESS NOTES
No chief complaint on file.      Subjective   [id number] : Mohsen Ardon is a 89 y.o. male admitted to [facility] for rehab.       HPI  Pt treated for dehydration and failure to thrive.   Pt seen while resting in bed.  He is alert and pleasant and offers no new complaints.  He is eating about 50% of him meals.   Dietician following and supplements have been ordered.   He denies any pain.   He had routine labs today which were reviewed.  Hemoglobin is stable at 8.3.   No new issues or concerns per nursing.  Pt lives with daughter.   Code status is DNR - CCA.  Review of Systems   Constitutional:  Negative for appetite change, chills and fever.   Respiratory:  Negative for cough, shortness of breath and wheezing.    Cardiovascular:  Negative for chest pain and leg swelling.   Gastrointestinal:  Negative for abdominal pain, constipation, diarrhea, nausea and vomiting.   Genitourinary:  Negative for dysuria, frequency and hematuria.   Neurological:  Negative for dizziness, tremors, weakness and headaches.   Psychiatric/Behavioral:  Negative for confusion. The patient is not nervous/anxious.    All other systems reviewed and are negative.      Objective   There were no vitals taken for this visit.   Physical Exam  Constitutional:       General: He is not in acute distress.     Comments: Thin body habitus.    Eyes:      Conjunctiva/sclera: Conjunctivae normal.      Pupils: Pupils are equal, round, and reactive to light.   Cardiovascular:      Rate and Rhythm: Normal rate and regular rhythm.      Heart sounds: No murmur heard.  Pulmonary:      Effort: Pulmonary effort is normal.      Breath sounds: No wheezing, rhonchi or rales.   Abdominal:      General: Abdomen is flat. Bowel sounds are normal. There is no distension.      Palpations: Abdomen is soft. There is no mass.      Tenderness: There is no abdominal tenderness.   Musculoskeletal:         General: No swelling. Normal range of motion.   Skin:     General: Skin  "is warm and dry.      Findings: No rash.   Neurological:      General: No focal deficit present.      Mental Status: He is alert and oriented to person, place, and time. Mental status is at baseline.       No lab exists for component: \"CBC BMP\"  Assessment/Plan   Problem List Items Addressed This Visit             ICD-10-CM    Dehydration - Primary E86.0     Monitor weekly labs.          Failure to thrive in adult R62.7     Dietician following.  Monitor for weight loss.  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  Hemoglobin improved on todays labs 8.4.          Chronic obstructive pulmonary disease (CMS/HCC) J44.9     Albuterol BID routine.          Atrial fibrillation (CMS/[facility]) I48.91     Rate controlled with metoprolol         Benign prostatic hyperplasia without lower urinary tract symptoms N40.0           Time spent: 30 min in review of chart, labs and orders, consultation with pt and documentation.   "

## 2023-10-12 NOTE — TELEPHONE ENCOUNTER
"Jasmin with  Home Health care calls to report she has spoken with ruthy's daughter who is refusing nursing at this time, and has asked to place a hold on Skilled Nursing service start of care until next week at which time they will discuss skilled nursing services again.  Patient daughter states \"we haveNursing coming every four weeks, Dot comes\".   "

## 2023-10-17 NOTE — HOME HEALTH
I feel good.  I was in the hospital.  I fell originally and then I went to rehab.  I have been home about a week ago.  This is my permanent residence.  Home from rehab on the October 7th.

## 2023-10-25 PROBLEM — D63.0 ANEMIA IN NEOPLASTIC DISEASE: Status: ACTIVE | Noted: 2021-09-08

## 2023-10-25 PROBLEM — J61 PULMONARY ASBESTOSIS (MULTI): Status: ACTIVE | Noted: 2023-01-01

## 2023-10-25 PROBLEM — I73.9 PERIPHERAL VASCULAR DISEASE (CMS-HCC): Status: ACTIVE | Noted: 2023-01-01

## 2023-10-25 PROBLEM — R09.02 HYPOXIA: Status: ACTIVE | Noted: 2023-01-01

## 2023-10-25 PROBLEM — I50.9 CHRONIC CONGESTIVE HEART FAILURE (MULTI): Status: ACTIVE | Noted: 2023-01-01

## 2023-10-25 PROBLEM — E55.9 VITAMIN D DEFICIENCY: Status: ACTIVE | Noted: 2023-01-01

## 2023-10-25 PROBLEM — R94.5 ABNORMAL LIVER FUNCTION: Status: ACTIVE | Noted: 2023-01-01

## 2023-10-25 PROBLEM — K57.92 DIVERTICULITIS: Status: ACTIVE | Noted: 2018-03-08

## 2023-10-26 NOTE — PROGRESS NOTES
"Subjective   Patient ID: Mohsen Ardon is a 89 y.o. male who presents for Follow-up (Post acute follow up. Status post hospitalization/SNF ).    Visit for 89 year old male seen today in private home, accompanied by daughter Christine for post acute follow up. Patient is lying on recliner in living room this morning. He is alert, able to answer simple questions without difficulty. His daughter does supplement HPI as needed.     Patient was hospitalized at [facility] from 9/16-9/20, admitted with mechanical fall, dehydration. Per ED provider note, presented to ED with increased weakness (unable to get out of bed), falls, decreased appetite, delerium. On day 6 of Bactrim for UTI. Hyponatremia, hyperkalemia noted. Plan, PT/OT consult, nutrition consult, swallow evaluation. He had CT Brain that was negative for acute findings. Imaging of spine showed multilevel degenerative changes with foraminal and mild interspace narrowing. Lab worked showed slightly elevated creatinine due to dehydration. He was treated with IV Fluids. Neurology was consulted and reports that mechanical fall was likely due to dehydration and acute illness. Urine studies were negative for UTI so the antibiotics were stopped.  Patient was discharged to [facility] SNF and returned back home with his daughter who is his primary caregiver on 10/7. He is active with [facility], SN and PT/OT services.     Patient is homebound. He does not drive and no longer leaves the home. His mobility is overall poor. He is able to ambulate using his walker, short distances but does require stand by assistance. He requires assistance with all ADLs. His daughter does all meal preparation. His appetite is overall poor and has declined since returning home from SNF. He has tried Mirtazepine in the past but developed many side effects and was \"out of it\". Christine has been making milkshakes and giving patient Ensure daily. Patient denies abdominal pain, nausea, vomiting, " diarrhea or constipation. Denies urinary problems or dysuria. Patient admits to shortness of breath with exertion, intermittent chronic cough, productive at times. He denies fever, chills, wheezing, palpitations. Patients daughter continues to monitor his vitals daily. He takes Metoprolol but Christine reports that she will frequently hold the medication if his BP/HR is low. Pt will have occasional dizziness if he sits up quickly but otherwise denies feeling dizzy/lightheaded.     Home Visit:          Medically necessary due to: patient has chronic condition that makes access to a traditional office visit very difficult , patient is physically or mentally disabled in a way that makes access to a traditional office visit very difficult        Current Outpatient Medications:     acetaminophen (Tylenol Arthritis Pain) 650 mg ER tablet, Take 1,300 mg by mouth 2 times a day as needed for mild pain (1 - 3). Indications: pain associated with arthritis, Disp: , Rfl:     albuterol 2.5 mg /3 mL (0.083 %) nebulizer solution, Take 2.5 mg by nebulization 2 times a day as needed for shortness of breath. Indications: chronic obstructive pulmonary disease, Disp: , Rfl:     bisacodyl (Dulcolax, bisacodyl,) 5 mg EC tablet, Take 1 tablet (5 mg) by mouth once daily as needed., Disp: , Rfl:     cholecalciferol (Vitamin D3) 5,000 Units tablet, Take 5,000 Units by mouth once daily. Indications: prevention of vitamin D deficiency, Disp: , Rfl:     eucerin cream, Apply 1 Application topically once daily., Disp: , Rfl:     ferrous sulfate (iron) 325 (65 Fe) MG tablet, Take 65 mg of iron by mouth once daily with a meal. Indications: anemia from inadequate iron, Disp: , Rfl:     indomethacin (Indocin) 50 mg capsule, Take 50 mg by mouth 3 times a day as needed for mild pain (1 - 3). Indications: a type of joint disorder due to excess uric acid in the blood called gout, Disp: , Rfl:     lidocaine 4 % patch, as directed Externally, Disp: , Rfl:      "metoprolol succinate XL (Toprol-XL) 25 mg 24 hr tablet, Take 25 mg by mouth once daily. Indications: high blood pressure, Disp: , Rfl:     nutritional drink (Ensure) liquid, Take 237 mL by mouth 3 times a day. with meals., Disp: , Rfl:     pantoprazole (ProtoNix) 40 mg EC tablet, Take 40 mg by mouth once daily. Indications: heartburn, Disp: , Rfl:     vitamin A & D ointment, Apply 1 Application topically once daily., Disp: , Rfl:     zinc gluconate 50 mg tablet, Take 50 mg of elemental zinc by mouth once daily. Indications: deficiency of zinc, Disp: , Rfl:      Review of Systems   Constitutional:  Positive for appetite change (decreased). Negative for chills and fever.        Positive for weight loss   HENT:  Positive for hearing loss. Negative for trouble swallowing.    Respiratory:  Positive for shortness of breath (with exertion). Negative for cough and wheezing.    Cardiovascular:  Negative for chest pain, palpitations and leg swelling.   Gastrointestinal:  Negative for abdominal pain, constipation, diarrhea, nausea and vomiting.   Genitourinary:         Positive for nocturia, chronic   Musculoskeletal:  Positive for arthralgias (chronic knee pain, left hip pain) and gait problem (uses walker).   Neurological:  Positive for dizziness (intermittent). Negative for light-headedness.   Psychiatric/Behavioral:          Negative for anxiety, depression     Objective   /50 (BP Location: Left arm, Patient Position: Sitting, BP Cuff Size: Adult)   Pulse 55   Temp 36.3 °C (97.3 °F) (Temporal)   Resp 16   Ht 1.753 m (5' 9\")   Wt 50.3 kg (111 lb)   SpO2 95%   BMI 16.39 kg/m²     Physical Exam  Constitutional:       General: He is not in acute distress.     Appearance: He is ill-appearing.      Comments: Sitting up in recliner with legs elevated   HENT:      Head: Normocephalic and atraumatic.      Nose: Nose normal.      Mouth/Throat:      Mouth: Mucous membranes are moist.      Pharynx: Oropharynx is clear. "   Eyes:      Extraocular Movements: Extraocular movements intact.      Pupils: Pupils are equal, round, and reactive to light.   Cardiovascular:      Rate and Rhythm: Normal rate. Rhythm irregular.      Heart sounds: Normal heart sounds.   Pulmonary:      Effort: Pulmonary effort is normal. No respiratory distress.      Breath sounds: Normal breath sounds. No wheezing, rhonchi or rales.   Abdominal:      General: Bowel sounds are normal.      Palpations: Abdomen is soft.   Musculoskeletal:      Cervical back: Neck supple.      Right lower leg: No edema.      Left lower leg: No edema.      Comments: Muscle wasting noted   Skin:     General: Skin is warm and dry.      Comments: Skin to LE dry, flaky   Neurological:      General: No focal deficit present.      Mental Status: He is alert.      Motor: Weakness present.      Gait: Gait abnormal.   Psychiatric:         Mood and Affect: Mood normal.         Behavior: Behavior normal.       Assessment/Plan   Diagnoses and all orders for this visit:  Dehydration  Comments:  s/p hospitalization/SNF with improvement. Pt to continue drinking adequate fluids.  Fall, subsequent encounter  Comments:  Resolved. No injury. Pt to continue working with PT/OT. Using walker with stand by assistance when ambulating. Continue home exercises  History of myelodysplastic syndrome  Comments:  chronic, continue Ferrous Sulfate, Zinc  Longstanding persistent atrial fibrillation (CMS/HCC)  Comments:  chronic, HR controlled, not on AC due to MDS, anemia. Continue Metoprolol if vitals stable  Failure to thrive in adult  Comments:  chronic, overall health declining. Pt/dtr not ready for hospice at this time  Weight loss  Comments:  ongoing concern, dtr does not want additional medications, discussed increasing Ensure to 2-3 times per day.    Patient delicately stable. Seen today in no acute distress but he is at high risk for continued health decline/rehospitalization. Will follow up with pt in 4  weeks.        Dot King, APRN-CNP

## 2023-11-01 NOTE — TELEPHONE ENCOUNTER
Christine, patient daughter calls today to request patient receive Home Health Skilled Nursing as she feels his BP has been very low and would like some assistance with managing his meds.  Please Advise.

## 2023-11-02 NOTE — HOME HEALTH
"    Patient's DTR met me at door, indicating that ...his eyes have glossed over and he is really emaciated.  I don't know if he has another UTI or something.  CAT from house calls. M.D. is Dr. Rivera.  He referred him to house calls.       No pain.   Patient found slouching on his easy. As long as I keep up with him and him doing the exercises with him I think he will be OK.    Review of written exercises  1.  Seated ankle pumps  2.  Seated Marching or hip flexion  3.  Seated full arc quads or \"kicks\"  4.  Seated hip abduction  5.  Supine ankle pumps  6.  Supine Heel slides  7.  Supine Straight leg raises  8.  Supine hip abduction        For up to 20 repetitions, twice a day."

## 2023-11-02 NOTE — TELEPHONE ENCOUNTER
Patient sister Christine made aware [facility] staff has spoke with [facility] services, Skilled nursing will be calling to schedule visits.

## 2023-11-04 PROBLEM — J96.00 ACUTE RESPIRATORY FAILURE, UNSPECIFIED WHETHER WITH HYPOXIA OR HYPERCAPNIA (MULTI): Status: ACTIVE | Noted: 2023-01-01

## 2023-11-04 NOTE — ED PROVIDER NOTES
HPI   Chief Complaint   Patient presents with    Respiratory Distress       Patient a 89-year-old male presents emergency department for evaluation of agonal breathing.  MS reports that patient has been in and out of the hospital with dehydration, pneumonia, and generalized decline in just was discharged from acute rehab back home.  Daughter called EMS for difficulty breathing and upon their arrival patient was very somnolent with shallow breathing.  They began bagging him and brought him here.  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  Patient is unable to participate in HPI given somnolence and agonal breathing.  EMS report that patient is full code reportedly after asking daughter who called EMS.      History provided by:  EMS personnel   used: No                        No data recorded                Patient History   Past Medical History:   Diagnosis Date    Asbestosis (CMS/HCC)     with restrictive lung changes on pfts    Benign positional vertigo     Carotid atherosclerosis, left     COPD (chronic obstructive pulmonary disease) (CMS/HCC)     Dermatitis     Diverticulosis     history of colon polyps    DVT (deep venous thrombosis) (CMS/HCC)     Edema     Gout     History of pulmonary embolus (PE)     Hypertension     Leukopenia     /pancytopenia - Dr. Bullock    MDS (myelodysplastic syndrome) (CMS/HCC)     with pancytopenia    Osteoarthritis     Shingles      Past Surgical History:   Procedure Laterality Date    CATARACT EXTRACTION Bilateral 2008    IOP    COLONOSCOPY  01/2006    KNEE SURGERY Right 1986    arthroscopic knee surgery    TOTAL KNEE ARTHROPLASTY  03/2012    Dr. Ordonez  [facility]    TOTAL KNEE ARTHROPLASTY Left 07/2020     Family History   Problem Relation Name Age of Onset    Stroke Mother      Heart disease Father      No Known Problems Sister      Emphysema Son       Social  History     Tobacco Use    Smoking status: Former     Types: Cigarettes     Quit date:      Years since quittin.8    Smokeless tobacco: Not on file   Substance Use Topics    Alcohol use: Not Currently    Drug use: Never       Physical Exam   ED Triage Vitals [23 0957]   Temp Heart Rate Resp BP   36.7 °C (98.1 °F) 89 26 161/61      SpO2 Temp Source Heart Rate Source Patient Position   99 % Temporal -- --      BP Location FiO2 (%)     -- --       Physical Exam  Constitutional:       Appearance: He is cachectic.      Comments: Somnolent frail appearing elderly gentleman with agonal breathing.   HENT:      Head: Normocephalic and atraumatic.      Mouth/Throat:      Mouth: Mucous membranes are dry.   Eyes:      Pupils: Pupils are equal, round, and reactive to light.   Cardiovascular:      Rate and Rhythm: Normal rate.      Pulses: Normal pulses.   Pulmonary:      Effort: Tachypnea present.      Breath sounds: Decreased air movement present.      Comments: Shallow breathing.  Abdominal:      General: Abdomen is flat.      Palpations: Abdomen is soft.   Musculoskeletal:         General: No swelling or deformity.   Skin:     General: Skin is warm.      Coloration: Skin is pale.   Neurological:      GCS: GCS eye subscore is 1. GCS verbal subscore is 1. GCS motor subscore is 4.         ED Course & MDM   Diagnoses as of 23 1144   Acute respiratory failure, unspecified whether with hypoxia or hypercapnia (CMS/[facility])       Medical Decision Making  Patient is a 89-year-old male presents emergency department for evaluation of respiratory failure and agonal breathing.    EKG was interpreted by attending physician and reviewed by me.    Lab work done today included CMP, CBC, troponins, proBNP, ABG, lactic acid, collection of blood cultures, urinalysis, and COVID swab.  Lab work with pancytopenia, initial troponin of 58 with repeat pending at time of admission, hyponatremia, elevated proBNP, and hematuria.  COVID  swab negative. ABG pending at time of admission.    Scans done today were interpreted/confirmed by radiologist and also interpreted by me which included chest x-ray.  Chest x-ray shows probable bilateral effusions, probable COPD, remote granulomatosis disease of the chest, and endotracheal tube in place.    Medications given at today's visit include IV cefepime, IV fluids, IV Versed, IV etomidate, IV succinylcholine     I saw this patient in conjunction with Dr. Ray.  Upon arrival patient had very shallow breathing with agonal respirations not moving air well with GCS of 4 given somnolence and inability to follow commands, no eye opening, and withdrawing from pain.  Because of this, patient was intubated for respiratory failure and agonal breathing by me as detailed in separate procedure note.  Induction agent of etomidate was used and paralytic of succinylcholine was used.  Intubation was successful and confirmed by bilateral breath sounds, direct visualization of tube and vocal cords, and chest x-ray.  Given patient's somnolence with agonal breathing, sepsis order set was ordered including broad-spectrum IV antibiotics and 30 mL/kg IV fluids.  Patient has no evidence of sepsis and noted to have pancytopenia with hyponatremia likely related to component of dehydration.  Given patient's respiratory failure with intubation, patient be admitted for further management.  I spoke with hospitalist on-call who was agreeable to admission of patient for further management.    The patient/family was counseled on clinical impression, expectations, and plan along with recommendations to admission.  All questions were answered and involved parties were understanding and agreeable to course of treatment.  Case was discussed with admitting physician and any consultants. Bed type, ED treatment and further ED workup decided by joint decision making with admitting team and any consultants. Patient stable for admission per my  assessment and further management of patient will be deferred to the inpatient setting.    Critical care time was billed at 45 minutes by me and is non concurrent with other provider involved.  Time excludes other separately billable procedures.    ** Disclaimer:  Parts of this document were written utilizing a voice to text dictation software.  Note may contain minor transcription or typographical errors that were inadvertently transcribed by the computer software.        Procedure  Intubation    Performed by: Shelia Cadena PA-C  Authorized by: Misty Ray MD    Consent:     Consent obtained:  Emergent situation  Pre-procedure details:     Indications: airway protection and respiratory failure      Patient status:  Unresponsive    Obstruction: none      Pharmacologic strategy: RSI      Induction agents:  Etomidate    Paralytics:  Succinylcholine  Procedure details:     Preoxygenation:  Bag valve mask    CPR in progress: no      Number of attempts:  1  Successful intubation attempt details:     Intubation method:  Oral    Intubation technique: video assisted      Laryngoscope blade:  Mac 3    Bougie used: no      Tube size (mm):  8.0    Tube type:  Cuffed    Tube visualized through cords: yes    Placement assessment:     ETT at teeth/gumline (cm):  24    Tube secured with:  ETT mclain    Placement verification: colorimetric ETCO2, direct visualization and equal breath sounds         Shelia Cadena PA-C  11/04/23 1145

## 2023-11-04 NOTE — ED NOTES
Pt extubated at this time. Family at Bedside Placed on 5L o2 NC     Tesha Dailey RN  11/04/23 7400

## 2023-11-04 NOTE — PROGRESS NOTES
Attestation note/supervisory note for CHERYL Cadena      The patient is a 89-year-old male presenting to the emergency department by EMS from home for respiratory arrest.  The patient reportedly has been declining over the past several months.  He reportedly has been in rehab recently after being treated for pneumonia and a urinary tract infection.  The patient reportedly was doing much worse today and his daughter called EMS.  EMS reports that the daughter did confirm that his CODE STATUS is full code.  The patient is not able to provide any details regarding HPI or review of systems as he is somnolent and responsive only to painful stimuli upon arrival.  Per review of the patient's electronic medical record, he does have a history of myelodysplastic syndrome, COPD, adult failure to thrive syndrome and recent pneumonia.  Vital signs with hypertension but otherwise within normal limits.  Physical exam with a cachectic male who is somnolent.  HEENT exam with dry mucous membranes but otherwise unremarkable.  He has no evidence of airway compromise but has shallow respirations.  Abdominal exam with a soft abdomen.  He does not cooperate with neurologic testing at this time.  No visible or palpable bony deformity.      The patient was intubated upon arrival for respiratory failure.  He was intubated by CHERYL Cadena without complication.      EKG with sinus rhythm at 81 bpm, low voltage, normal ST segment, normal T waves.  There is some limitation by motion artifact.      Cultures were obtained and IV fluids and IV antibiotics were ordered.      IV Versed was ordered for sedation      Diagnostic labs with elevated BNP, pancytopenia, electrolyte imbalance and elevated BNP but otherwise unremarkable.      Initial Troponin T 58.  Repeat Troponin T pending at the time of admission      COVID-19 testing negative      Initial lactic acid 1.0. repeat lactic pending at the time of admission      Chest x-ray  IMPRESSION:  Probable  bilateral effusions.  Probable COPD.  Remote granulomatous disease of the chest.  Tube placement as above.      The patient does not have any evidence of pneumonia on chest x-ray but he does have bilateral pulmonary effusions and granulomatous disease of the chest.  He does not have any evidence of sepsis by vital signs or lactic acid.  He does not have any visible or palpable bony deformity.  He does not have any evidence of ischemia on EKG but he does have an elevated troponin T result.  No events on telemetry.      The patient was admitted for further management of his respiratory failure.      Critical care time of  34 minutes billed for management of respiratory distress with monitoring of the patient on telemetry, management of the Versed drip, consultation with the patient's family regarding his results, consultation with accepting provider and arrangement for admission..  This time excludes time for billable procedures.      critical care time billed for by me is non concurrent with time billed for by CHERYL Cadena      I personally saw the patient and performed a substantive portion of the visit including all aspects of the medical decision making.      I reviewed the results of the diagnostic labs and diagnostic imaging.  Formal radiology reading was completed by the radiologist    Misty Ray MD

## 2023-11-04 NOTE — SIGNIFICANT EVENT
The patient's family is ready to switch his care to comfort measures only.   Extubate  End of life orders placed.   Admit to RNF  Discontinue IV fluids, IV antibiotics, IV protonix

## 2023-11-04 NOTE — NURSING NOTE
BSSR given and patient's daughter is at bed side and call light is within reach. Patient has no signs of discomfort at this time.

## 2023-11-04 NOTE — NURSING NOTE
BSSR received. Patient is end of life, daughter at bedside. Hospitalist notified MAR hold on patient meds, and need for stewart catheter order. MD also said OK to not draw last troponin.

## 2023-11-04 NOTE — ED NOTES
Family awaiting arrival for nephew to withdrawal care to the pt. Refused the ICU bed. Dr. Montano aware and ok with plan.      Tesha Dailey, RN  11/04/23 4713

## 2023-11-04 NOTE — NURSING NOTE
Assumed care of patient from ED who is end of life, family is in the room patient is on 5 L of O2 and his lungs are crackles in to all lung fields, breathing is now labored and he is having a 4 sec apnea,  catheter in place and patient had a BM when came to floor.Familyl is in the room and thinking about hospice, Patient is a DRN-DNI.

## 2023-11-04 NOTE — H&P
History Of Present Illness  Mohsen Ardon is a 89 y.o. male presenting with low oxygen level and altered mental status.   At the time of presentation, the patient had been intubated and on mechanical ventilation.  He was being sedated with Versed.  History was obtained from the emergency department provider and also from his daughter and POA, Christine Gao, at the bedside.   He has had poor oral intake the past few days and has not wanted to eat.  His voice has become raspy and hoarse.  He had been drooling when he attempted to drink water.  He had also had slow, loud respiration overnight which prompted his daughter to call 911.  He has had no fever, no episodes of choking on food.  He has not complained of being short of breath, however per his daughter, he had appeared to be short of breath and he had variable oxygen saturations between 79% and 90%.  On arrival in the emergency department, he was hypoxic with oxygen saturation of 78%, with shallow agonal type breathing and a Ace Coma Scale of 4.  He was intubated.  It was reported that he was a full code, however his daughter states that he is DNR.  Chest x-ray showed bilateral pleural effusions.  He received 2 g of IV cefepime.  He was started on Versed for sedation.  ICU admission was requested.     Past Medical History  Past Medical History:   Diagnosis Date    Asbestosis (CMS/HCC)     with restrictive lung changes on pfts    Benign positional vertigo     Carotid atherosclerosis, left     COPD (chronic obstructive pulmonary disease) (CMS/HCC)     Dermatitis     Diverticulosis     history of colon polyps    DVT (deep venous thrombosis) (CMS/HCC)     Edema     Gout     History of pulmonary embolus (PE)     Hypertension     Leukopenia     /pancytopenia - Dr. Bullock    MDS (myelodysplastic syndrome) (CMS/HCC)     with pancytopenia    Osteoarthritis     Shingles        Surgical History  Past Surgical History:   Procedure Laterality Date    CATARACT EXTRACTION  "Bilateral 2008    [facility]    COLONOSCOPY  01/2006    KNEE SURGERY Right 1986    arthroscopic knee surgery    TOTAL KNEE ARTHROPLASTY  03/2012    Dr. Ordonez  [facility]    TOTAL KNEE ARTHROPLASTY Left 07/2020        Social History  He reports that he quit smoking about 43 years ago. His smoking use included cigarettes. He does not have any smokeless tobacco history on file. He reports that he does not currently use alcohol. He reports that he does not use drugs.    Family History  Family History   Problem Relation Name Age of Onset    Stroke Mother      Heart disease Father      No Known Problems Sister      Emphysema Son          Allergies  Patient has no known allergies.    Review of Systems  It was not possible to do a review of systems because he was intubated on the ventilator, not responding      Physical Exam   General. Intubated, mechanically ventilated and sedated, ill appearing.    HEENT: An endotracheal tube is in place, connected to the ventilator   Neck: Supple, no carotid bruit, no thyroid enlargement.   Cardiovascular: Regular heart rate and rhythm normal S1 and S2.   Respiratory: Equal breath sounds bilaterally clear to auscultation.   Abdomen: Soft, nontender to palpation, bowel sounds present and normoactive.   Extremities: No peripheral cyanosis, no pedal edema.   Neurologic: Intubated, mechanically ventilated and sedated   Dermatologic: No rash, ecchymosis, or jaundice.   Psychological: Unable to evaluate because intubated, sedated, on the ventilator.          Last Recorded Vitals  Blood pressure 115/55, pulse 81, temperature 36.7 °C (98.1 °F), temperature source Temporal, resp. rate 23, height 1.803 m (5' 11\"), weight 55.4 kg (122 lb 0.8 oz), SpO2 100 %.    Relevant Results  Results for orders placed or performed during the hospital encounter of 11/04/23 (from the past 24 hour(s))   NT Pro-BNP   Result Value Ref Range    PROBNP 1,609 (H) 0 - 852 pg/mL   CBC and Auto Differential   Result Value " Ref Range    WBC 2.5 (L) 4.4 - 11.3 x10*3/uL    nRBC 0.0 0.0 - 0.0 /100 WBCs    RBC 3.36 (L) 4.50 - 5.90 x10*6/uL    Hemoglobin 9.8 (L) 13.5 - 17.5 g/dL    Hematocrit 34.3 (L) 41.0 - 52.0 %     (H) 80 - 100 fL    MCH 29.2 26.0 - 34.0 pg    MCHC 28.6 (L) 32.0 - 36.0 g/dL    RDW 16.0 (H) 11.5 - 14.5 %    Platelets 87 (L) 150 - 450 x10*3/uL    Neutrophils % 42.1 40.0 - 80.0 %    Immature Granulocytes %, Automated 0.0 0.0 - 0.9 %    Lymphocytes % 39.6 13.0 - 44.0 %    Monocytes % 17.1 2.0 - 10.0 %    Eosinophils % 0.4 0.0 - 6.0 %    Basophils % 0.8 0.0 - 2.0 %    Neutrophils Absolute 1.03 (L) 1.60 - 5.50 x10*3/uL    Immature Granulocytes Absolute, Automated 0.00 0.00 - 0.50 x10*3/uL    Lymphocytes Absolute 0.97 0.80 - 3.00 x10*3/uL    Monocytes Absolute 0.42 0.05 - 0.80 x10*3/uL    Eosinophils Absolute 0.01 0.00 - 0.40 x10*3/uL    Basophils Absolute 0.02 0.00 - 0.10 x10*3/uL   Comprehensive metabolic panel   Result Value Ref Range    Glucose 96 65 - 99 mg/dL    Sodium 146 (H) 133 - 145 mmol/L    Potassium 4.4 3.4 - 5.1 mmol/L    Chloride 106 97 - 107 mmol/L    Bicarbonate 33 (H) 24 - 31 mmol/L    Urea Nitrogen 31 (H) 8 - 25 mg/dL    Creatinine 0.90 0.40 - 1.60 mg/dL    eGFR 80 >60 mL/min/1.73m*2    Calcium 9.5 8.5 - 10.4 mg/dL    Albumin 3.1 (L) 3.5 - 5.0 g/dL    Alkaline Phosphatase 81 35 - 125 U/L    Total Protein 8.7 (H) 5.9 - 7.9 g/dL    AST 17 5 - 40 U/L    Bilirubin, Total 0.5 0.1 - 1.2 mg/dL    ALT 5 5 - 40 U/L    Anion Gap 7 <=19 mmol/L   BLOOD GAS LACTIC ACID, VENOUS   Result Value Ref Range    POCT Lactate, Venous 1.0 0.4 - 2.0 mmol/L   Serial Troponin, Initial ([facility])   Result Value Ref Range    Troponin T, High Sensitivity 58 (H) <=15 ng/L   Morphology   Result Value Ref Range    RBC Morphology No significant RBC morphology present    SARS-CoV-2 RT PCR   Result Value Ref Range    Coronavirus 2019, PCR Not Detected Not Detected   Urinalysis with Reflex Microscopic   Result Value Ref Range    Color,  Urine Yellow Light-Yellow, Yellow, Dark-Yellow    Appearance, Urine Clear Clear    Specific Gravity, Urine 1.019 1.005 - 1.035    pH, Urine 5.0 5.0, 5.5, 6.0, 6.5, 7.0, 7.5, 8.0    Protein, Urine 20 (TRACE) NEGATIVE, 10 (TRACE), 20 (TRACE) mg/dL    Glucose, Urine Normal Normal mg/dL    Blood, Urine 0.03 (TRACE) (A) NEGATIVE    Ketones, Urine NEGATIVE NEGATIVE mg/dL    Bilirubin, Urine NEGATIVE NEGATIVE    Urobilinogen, Urine Normal Normal mg/dL    Nitrite, Urine NEGATIVE NEGATIVE    Leukocyte Esterase, Urine NEGATIVE NEGATIVE   Microscopic Only, Urine   Result Value Ref Range    WBC, Urine 1-5 1-5, NONE /HPF    RBC, Urine 6-10 (A) NONE, 1-2, 3-5 /HPF        Assessment/Plan   Principal Problem:    Acute respiratory failure, unspecified whether with hypoxia or hypercapnia (CMS/HCC)    Acute hypoxic respiratory failure  Possible causes: COPD, pneumonia,  Intubated in the ER, currently on the ventilator.     Metabolic encephalopathy  Probably secondary to hypoxia  Hypercapnia is a possible cause: He did not have an ABG prior to intubation.     Myelodysplasia    Pancytopenia  Probably secondary to myelodysplasia    COPD    Hypernatremia - mild  D5 1/2 normal saline infusion    Discussed with the patient's daughter and POA,Verónica and his son, Elijah, who arrived later. His son also said that the patient has DNR paperwork.  He had told the family that he would not want to be on a ventilator.  Given the severity of his illness, and his comorbidities, son and daughter would not want to escalate treatment. They plan to withdraw care after the patient's grandson arrives.   Plan: IV antibiotics, IV protonix, IV fluids.  No ABG or additional imaging studies, no labs. After extubation, if needed, will change status to RNF      Eleanor Montano MD

## 2023-11-04 NOTE — PROGRESS NOTES
Pt sx above and below cuff Pt extubated to 5 L NC- family at bedside. Morphine given prior to extubation.

## 2023-11-04 NOTE — ED NOTES
Pt receiving 1mg of versed. Titrated up to 1.5mg  when pt was pulling at ETtube and pat Dailey RN  11/04/23 3904

## 2023-11-04 NOTE — PROGRESS NOTES
Mohsen Ardon is a 89 y.o. male on day 0 of admission presenting with Acute respiratory failure, unspecified whether with hypoxia or hypercapnia (CMS/HCC).    Patient has been extubated, comfort care only with end of life orders.   No needs from care coordination.    Michelle Ferro RN

## 2023-11-05 NOTE — NURSING NOTE
Hospice in to see family. They are going with hospice at home. Discharge is planned for tomorrow. Everything will be set up.

## 2023-11-05 NOTE — PROGRESS NOTES
"Mohsen Ardon is a 89 y.o. male on day 1 of admission presenting with Acute respiratory failure, unspecified whether with hypoxia or hypercapnia (CMS/HCC) and acute encephalopathy.    Subjective   His daughter, Christine, was at the bedside. The patient was in no acute distress, unresponsive.      Objective     Physical Exam  General: unresponsive, not in acute respiratory distress  Cardiovascular: regular, normal S1 and S2  Lungs: good air entry bilaterally, clear to auscultation  Abdomen: soft, bowel sounds present  Extremities: no p;eripheral cyanosis, no pedal edema  Neuro: not responsive or awake,         Last Recorded Vitals  Blood pressure (!) 79/24, pulse 82, temperature 36.3 °C (97.3 °F), temperature source Axillary, resp. rate 22, height 1.778 m (5' 10\"), weight 48 kg (105 lb 13.1 oz), SpO2 99 %.  Intake/Output last 3 Shifts:  I/O last 3 completed shifts:  In: 2121 (44.2 mL/kg) [IV Piggyback:2121]  Out: 200 (4.2 mL/kg) [Urine:200 (0.1 mL/kg/hr)]  Weight: 48 kg     Scheduled medications       Continuous medications     PRN medications  PRN medications: haloperidol lactate, hyoscyamine, LORazepam, morphine, oxygen         Assessment/Plan   Principal Problem:    Acute respiratory failure, unspecified whether with hypoxia or hypercapnia (CMS/HCC)    Acute hypoxic respiratory failure  Possible causes: COPD, pneumonia,  Intubated in the ER 11/04, now DNR comfort care, extubated 11/04     Metabolic encephalopathy  Further workup of encephalopathy not pursued because of DNR CC code status    Myelodysplasia     Pancytopenia  Probably secondary to myelodysplasia     COPD     Hypernatremia - mild  Off IV fluids    Continue end of life measures. Haldol order changed from scheduled to p.r.n. Discussed with his daughter, a hospice consult has been requested.      Eleanor Montano MD      "

## 2023-11-05 NOTE — NURSING NOTE
Assumed care of patient. Patient is resting in bed comfortably with family at the bedside. He is wearing oxygen at 4LNC with no distress noted. Call light is within reach, and family made aware that if anything is needed to call on the light. Will continue to monitor.

## 2023-11-05 NOTE — NURSING NOTE
RN Hospice Note    Mohsen Ardon is a Hospice Patient.   Hospice terminal diagnosis: Acute Respiratory Failure 2nd COPD   Physician: Dr. Eleanor Montano    Visit type: Initial Visit      Discharge Planning:  Patient to be discharged to home tomorrow - [address] [id number]     Plan of care reviewed with patient/family members   - Patient assessed at bedside: lying in bed, unresponsive, on 4L NC, RR 18-20, no s/sx of pain or shortness of breath, appears comfortable, PAINAD 0, stewart in place with minimal UO.  - Met with dgtr/reported DPOAHC Christine Gao at bedside. Discussed [facility] Services, POC, and philosophy. Goals of care discussed and Christine agreeable with DNRCC - already in place in hospital. Spoke with patients son Shaan via TC and he agrees that DPOAHC is Christine and gives permission for her to be decision maker. Christine to bring copies of DPOAHC to hospital tomorrow. Christine signed all forms and copies provided as well as placed in chart. Christine is aware that a nurse from [facility] will visit tomorrow to assist with DC home. DME ordered and will be delivered between 8a-12p tomorrow.     Plan of care reviewed with hospital staff members:   - Dr. Montano updated on meeting outcome and agreeable with plan.   - TESFAYE Fitzgerald and JOHNY Cobian updated.     Please call [facility] if patient passes: [phone]   home: [address] in [place]  [phone]    RN to visit tomorrow to assist with DC home if patient is still with us.     Connie Prescott, TESFAYE  [phone]

## 2023-11-06 NOTE — NURSING NOTE
BSSR received. Oncoming RN completed vitals, pt respirations increased to 28, daughter Christine not currently at the bedside so this RN called to notify her that I will be giving him his PRN morphine to help relax him. Christine will be in shortly to see him.

## 2023-11-06 NOTE — NURSING NOTE
Postmortem care complicated and all notifications made. Security to come take body to the morgue.  Home plan for pickup around 0630 am 2023

## 2023-11-06 NOTE — CARE PLAN
Problem: Daily Care  Goal: Daily care needs are met  11/5/2023 2321 by Mary Ann Gordon RN  Flowsheets (Taken 11/5/2023 2321)  Daily care needs are met:   Provide mouth care   Include patient/family/caregiver in decisions related to daily care  11/5/2023 2314 by Mary Ann Gordon RN  Outcome: Progressing      
  Problem: Skin  Goal: Decreased wound size/increased tissue granulation at next dressing change  Outcome: Progressing      
The patient's goals for the shift include comfort for patient and family    The clinical goals for the shift include comfort for patient and family      Problem: Daily Care  Goal: Daily care needs are met  Outcome: Progressing     Problem: Skin  Goal: Decreased wound size/increased tissue granulation at next dressing change  Outcome: Progressing  Flowsheets (Taken 11/5/2023 0045)  Decreased wound size/increased tissue granulation at next dressing change:   Promote sleep for wound healing   Protective dressings over bony prominences  Goal: Participates in plan/prevention/treatment measures  Outcome: Progressing  Flowsheets (Taken 11/5/2023 0045)  Participates in plan/prevention/treatment measures: Elevate heels  Goal: Prevent/manage excess moisture  Outcome: Progressing  Flowsheets (Taken 11/5/2023 0045)  Prevent/manage excess moisture:   Moisturize dry skin   Cleanse incontinence/protect with barrier cream  Goal: Prevent/minimize sheer/friction injuries  Outcome: Progressing  Flowsheets (Taken 11/5/2023 1340 by Ines Fitzgerald RN)  Prevent/minimize sheer/friction injuries: HOB 30 degrees or less  Goal: Promote/optimize nutrition  Outcome: Progressing  Flowsheets (Taken 11/5/2023 0045)  Promote/optimize nutrition: (pt end of life) Offer water/supplements/favorite foods  Goal: Promote skin healing  Outcome: Progressing  Flowsheets (Taken 11/5/2023 0045)  Promote skin healing: Assess skin/pad under line(s)/device(s)     
The patient's goals for the shift include pain free    The clinical goals for the shift include comfort      Problem: Daily Care  Goal: Daily care needs are met  Outcome: Progressing     Problem: Skin  Goal: Decreased wound size/increased tissue granulation at next dressing change  Outcome: Progressing  Goal: Participates in plan/prevention/treatment measures  Outcome: Progressing  Goal: Prevent/manage excess moisture  Outcome: Progressing     Problem: Pain  Goal: My pain/discomfort is manageable  Outcome: Met     Problem: Safety  Goal: Patient will be injury free during hospitalization  Outcome: Met  Goal: I will remain free of falls  Outcome: Met     Problem: Psychosocial Needs  Goal: Demonstrates ability to cope with hospitalization/illness  Outcome: Met  Goal: Collaborate with me, my family, and caregiver to identify my specific goals  Outcome: Met     Problem: Discharge Barriers  Goal: My discharge needs are met  Outcome: Met     Problem: Skin  Goal: Prevent/minimize sheer/friction injuries  Flowsheets (Taken 11/5/2023 5258)  Prevent/minimize sheer/friction injuries: HOB 30 degrees or less     
The patient's goals for the shift include pain free    The clinical goals for the shift include comfort      Problem: Pain  Goal: My pain/discomfort is manageable  Outcome: Progressing     Problem: Safety  Goal: Patient will be injury free during hospitalization  Outcome: Progressing  Goal: I will remain free of falls  Outcome: Progressing     Problem: Daily Care  Goal: Daily care needs are met  Outcome: Progressing     Problem: Psychosocial Needs  Goal: Demonstrates ability to cope with hospitalization/illness  Outcome: Progressing  Goal: Collaborate with me, my family, and caregiver to identify my specific goals  Outcome: Progressing     Problem: Discharge Barriers  Goal: My discharge needs are met  Outcome: Progressing       
The patient's goals for the shift include pain free    The clinical goals for the shift include comfort      Problem: Pain  Goal: My pain/discomfort is manageable  Outcome: Progressing     Problem: Safety  Goal: Patient will be injury free during hospitalization  Outcome: Progressing  Goal: I will remain free of falls  Outcome: Progressing     Problem: Daily Care  Goal: Daily care needs are met  Outcome: Progressing     Problem: Psychosocial Needs  Goal: Demonstrates ability to cope with hospitalization/illness  Outcome: Progressing  Goal: Collaborate with me, my family, and caregiver to identify my specific goals  Outcome: Progressing     Problem: Discharge Barriers  Goal: My discharge needs are met  Outcome: Progressing     Problem: Skin  Goal: Decreased wound size/increased tissue granulation at next dressing change  11/5/2023 0045 by Mary Ann Gordon RN  Flowsheets (Taken 11/5/2023 0045)  Decreased wound size/increased tissue granulation at next dressing change:   Promote sleep for wound healing   Protective dressings over bony prominences  11/4/2023 2344 by Mary Ann Gordon RN  Outcome: Progressing       
The patient's goals for the shift include pain free    The clinical goals for the shift include comfort    Over the shift, the patient did not make progress toward the following goals. Barriers to progression include end of life. Recommendations to address these barriers include  reduce pain..    
PROVIDER:[TOKEN:[[id number]],FOLLOWUP:[1-3 Days]]

## 2023-11-06 NOTE — DISCHARGE SUMMARY
Discharge Diagnosis  Acute respiratory failure, unspecified whether with hypoxia or hypercapnia (CMS/HCC)    Issues Requiring Follow-Up  N/a    Discharge Meds     Your medication list        ASK your doctor about these medications        Instructions Last Dose Given Next Dose Due   albuterol 2.5 mg /3 mL (0.083 %) nebulizer solution           Dulcolax (bisacodyl) 5 mg EC tablet  Generic drug: bisacodyl           Ensure liquid  Generic drug: nutritional drink           eucerin cream           indomethacin 50 mg capsule  Commonly known as: Indocin           iron 325 (65 Fe) MG tablet  Generic drug: ferrous sulfate           lidocaine 4 % patch           metoprolol succinate XL 25 mg 24 hr tablet  Commonly known as: Toprol-XL           pantoprazole 40 mg EC tablet  Commonly known as: ProtoNix           Tylenol Arthritis Pain 650 mg ER tablet  Generic drug: acetaminophen           vitamin A & D ointment           Vitamin D3 5,000 Units tablet  Generic drug: cholecalciferol           zinc gluconate 50 mg tablet                    Test Results Pending At Discharge  Pending Labs       Order Current Status    BLOOD GAS LACTIC ACID, VENOUS In process    Blood Culture Preliminary result    Blood Culture Preliminary result            Hospital Course   This is a 89-year-old man with history of COPD, asbestosis with restrictive lung changes, myelodysplastic syndrome presented to the emergency room in respiratory distress.  He arrived via EMS and was intubated upon arrival.  Ultimately family came in and said that the patient was DNR and never wanted to be on the ventilator.  At their request, patient was changed to DNR comfort care and extubated with comfort care measures.  He was treated symptomatically.  He  on 2023 at 1:30 AM.  He  secondary to acute respiratory failure with hypoxia and likely Hypercapnia secondary to presumed COPD exacerbation.  Family at the bedside at the time of  death.    Pertinent Physical Exam At Time of Discharge  Physical Exam  Patient unresponsive.  Heart sounds and breath sounds absent.  Pulseless.  Outpatient Follow-Up  Future Appointments   Date Time Provider Department Center   11/22/2023 12:30 PM DIANE Lambert-CNP [id number] None         Martha Foley DO

## 2023-11-06 NOTE — SIGNIFICANT EVENT
Called by the nurse to come see the patient.  Patient is unresponsive. Patient is pulseless, no heart or breath sounds auscultated.  Time of death 0130.   Plan is bedside and aware.

## 2023-11-06 NOTE — NURSING NOTE
2350: RN in to see patient after more frequent checks due to family at bedside noticing greater periods of apnea. Patient noted to still have a pulse however apnea episodes greater than 30 seconds.  0118: RN in with patient and unable to locate pulse, no respirations noted. Family member present at bedside.  0124: MD notified and stated will be up shortly.  0130: Dr Foley pronounced passing of patient.     Home picked by family- [facility]

## 2023-11-08 LAB
BACTERIA BLD CULT: NORMAL
BACTERIA BLD CULT: NORMAL

## 2023-11-22 ENCOUNTER — APPOINTMENT (OUTPATIENT)
Dept: PRIMARY CARE | Facility: CLINIC | Age: 88
End: 2023-11-22
Payer: MEDICARE